# Patient Record
Sex: MALE | Race: WHITE | NOT HISPANIC OR LATINO | Employment: FULL TIME | URBAN - METROPOLITAN AREA
[De-identification: names, ages, dates, MRNs, and addresses within clinical notes are randomized per-mention and may not be internally consistent; named-entity substitution may affect disease eponyms.]

---

## 2020-01-09 ENCOUNTER — OFFICE VISIT (OUTPATIENT)
Dept: FAMILY MEDICINE | Facility: CLINIC | Age: 30
End: 2020-01-09
Payer: COMMERCIAL

## 2020-01-09 VITALS
DIASTOLIC BLOOD PRESSURE: 66 MMHG | SYSTOLIC BLOOD PRESSURE: 114 MMHG | OXYGEN SATURATION: 98 % | BODY MASS INDEX: 33.46 KG/M2 | HEART RATE: 65 BPM | HEIGHT: 72 IN | WEIGHT: 247 LBS | TEMPERATURE: 98 F

## 2020-01-09 DIAGNOSIS — Z00.01 ENCOUNTER FOR ROUTINE ADULT HEALTH EXAMINATION WITH ABNORMAL FINDINGS: Primary | ICD-10-CM

## 2020-01-09 DIAGNOSIS — F98.8 ATTENTION DEFICIT DISORDER (ADD) IN ADULT: ICD-10-CM

## 2020-01-09 DIAGNOSIS — F41.8 MIXED ANXIETY AND DEPRESSIVE DISORDER: ICD-10-CM

## 2020-01-09 DIAGNOSIS — E66.09 CLASS 1 OBESITY DUE TO EXCESS CALORIES WITH BODY MASS INDEX (BMI) OF 33.0 TO 33.9 IN ADULT, UNSPECIFIED WHETHER SERIOUS COMORBIDITY PRESENT: ICD-10-CM

## 2020-01-09 PROCEDURE — 99385 PREV VISIT NEW AGE 18-39: CPT | Mod: S$GLB,,, | Performed by: NURSE PRACTITIONER

## 2020-01-09 PROCEDURE — 99385 PR PREVENTIVE VISIT,NEW,18-39: ICD-10-PCS | Mod: S$GLB,,, | Performed by: NURSE PRACTITIONER

## 2020-01-09 RX ORDER — SERTRALINE HYDROCHLORIDE 50 MG/1
TABLET, FILM COATED ORAL
Qty: 30 TABLET | Refills: 2 | Status: SHIPPED | OUTPATIENT
Start: 2020-01-09 | End: 2020-02-10

## 2020-01-09 RX ORDER — LISDEXAMFETAMINE DIMESYLATE 70 MG/1
70 CAPSULE ORAL EVERY MORNING
Qty: 30 CAPSULE | Refills: 0 | Status: SHIPPED | OUTPATIENT
Start: 2020-01-09 | End: 2020-02-10 | Stop reason: SDUPTHER

## 2020-01-09 NOTE — PROGRESS NOTES
SUBJECTIVE:      Patient ID: Paco Upton is a 29 y.o. male.    Chief Complaint: Annual Exam (wellness exam)    New patient here for a wellness visit today. He has no major PMH except ADD- he was diagnosed at age 10 and has been taking medications since. He is unable to concentrate and focus on is work without the medication. He does take the medication daily without SE or complaints with drug holidays on weekends and holidays off. He is due for routine labs today. He declines the flu shot today. He is c/o feeling down/mildly depressed and anxious- see below for further.     Anxiety   Presents for initial visit. Onset was 1 to 5 years ago. The problem has been gradually worsening. Symptoms include decreased concentration, depressed mood, nervous/anxious behavior and panic (with social activity/events). Patient reports no chest pain, confusion, dizziness, excessive worry, feeling of choking, impotence, insomnia, irritability, nausea, obsessions, palpitations, shortness of breath or suicidal ideas. Symptoms occur most days. The severity of symptoms is moderate. The symptoms are aggravated by social activities and work stress. The quality of sleep is good. Nighttime awakenings: none.     There are no known risk factors. His past medical history is significant for anxiety/panic attacks. There is no history of suicide attempts. Past treatments include lifestyle changes. The treatment provided no relief.       History reviewed. No pertinent family history.   Social History     Socioeconomic History    Marital status: Single     Spouse name: Not on file    Number of children: Not on file    Years of education: Not on file    Highest education level: Not on file   Occupational History    Not on file   Social Needs    Financial resource strain: Not on file    Food insecurity:     Worry: Not on file     Inability: Not on file    Transportation needs:     Medical: Not on file     Non-medical: Not on file   Tobacco  Use    Smoking status: Never Smoker   Substance and Sexual Activity    Alcohol use: Yes    Drug use: No    Sexual activity: Not on file   Lifestyle    Physical activity:     Days per week: Not on file     Minutes per session: Not on file    Stress: Not on file   Relationships    Social connections:     Talks on phone: Not on file     Gets together: Not on file     Attends Hinduism service: Not on file     Active member of club or organization: Not on file     Attends meetings of clubs or organizations: Not on file     Relationship status: Not on file   Other Topics Concern    Not on file   Social History Narrative    Not on file     Current Outpatient Medications   Medication Sig Dispense Refill    lisdexamfetamine (VYVANSE) 70 MG capsule Take 1 capsule (70 mg total) by mouth every morning. 30 capsule 0    sertraline (ZOLOFT) 50 MG tablet Take 1/2 tablet by mouth x 7 days at bedtime, then start 1 tablet nightly 30 tablet 2     No current facility-administered medications for this visit.      Review of patient's allergies indicates:   Allergen Reactions    Sulfa (sulfonamide antibiotics) Anaphylaxis      Past Medical History:   Diagnosis Date    ADD (attention deficit disorder)     Stomach problems     delayed empting     History reviewed. No pertinent surgical history.    Review of Systems   Constitutional: Negative for appetite change, chills, diaphoresis, fatigue, fever, irritability and unexpected weight change.   HENT: Negative for congestion, ear discharge, ear pain, rhinorrhea, sore throat and trouble swallowing.    Eyes: Negative for pain and visual disturbance.        Had recent eye exam    Respiratory: Negative for cough, shortness of breath and wheezing.    Cardiovascular: Negative for chest pain, palpitations and leg swelling.   Gastrointestinal: Negative for abdominal pain, blood in stool, constipation, diarrhea, nausea and vomiting.        No heartburn or GERD    Endocrine: Negative for  cold intolerance, heat intolerance, polydipsia and polyuria.   Genitourinary: Negative for dysuria, frequency, hematuria and impotence.   Musculoskeletal: Negative for arthralgias, back pain, joint swelling, myalgias and neck pain.   Skin: Negative for rash.   Neurological: Negative for dizziness, syncope, weakness and headaches.   Hematological: Negative for adenopathy. Does not bruise/bleed easily.   Psychiatric/Behavioral: Positive for decreased concentration and dysphoric mood. Negative for confusion, sleep disturbance and suicidal ideas. The patient is nervous/anxious. The patient does not have insomnia.       OBJECTIVE:      Vitals:    01/09/20 0759   BP: 114/66   BP Location: Left arm   Patient Position: Sitting   BP Method: X-Large (Manual)   Pulse: 65   Temp: 98.2 °F (36.8 °C)   TempSrc: Oral   SpO2: 98%   Weight: 112 kg (247 lb)   Height: 6' (1.829 m)     Physical Exam   Constitutional: He is oriented to person, place, and time. He appears well-developed and well-nourished. No distress.   HENT:   Head: Normocephalic and atraumatic.   Right Ear: Tympanic membrane, external ear and ear canal normal.   Left Ear: Tympanic membrane, external ear and ear canal normal.   Nose: Nose normal.   Mouth/Throat: Uvula is midline, oropharynx is clear and moist and mucous membranes are normal. No oropharyngeal exudate or posterior oropharyngeal erythema. Tonsils are 1+ on the right. Tonsils are 1+ on the left. No tonsillar exudate.   Eyes: Pupils are equal, round, and reactive to light. Conjunctivae are normal. No scleral icterus.   Neck: Normal range of motion. Neck supple. No thyromegaly present.   Cardiovascular: Normal rate, regular rhythm and normal heart sounds. Exam reveals no gallop and no friction rub.   No murmur heard.  Pulmonary/Chest: Effort normal and breath sounds normal. No respiratory distress. He has no wheezes. He has no rales.   Abdominal: Soft. Bowel sounds are normal. He exhibits no distension and no  mass. There is no tenderness.   Musculoskeletal: Normal range of motion. He exhibits no edema.   Lymphadenopathy:     He has no cervical adenopathy.   Neurological: He is alert and oriented to person, place, and time.   Skin: Skin is warm and dry. No rash noted. He is not diaphoretic.   Psychiatric: His speech is normal and behavior is normal. Judgment and thought content normal. Cognition and memory are normal. He exhibits a depressed mood (mildly ).   Nursing note and vitals reviewed.     Assessment:       1. Encounter for routine adult health examination with abnormal findings    2. Mixed anxiety and depressive disorder    3. Attention deficit disorder (ADD) in adult    4. Class 1 obesity due to excess calories with body mass index (BMI) of 33.0 to 33.9 in adult, unspecified whether serious comorbidity present        Plan:       Encounter for routine adult health examination with abnormal findings  -     CBC auto differential; Future; Expected date: 01/09/2020  -     Comprehensive metabolic panel; Future; Expected date: 01/09/2020  -     Lipid panel; Future; Expected date: 01/09/2020    Mixed anxiety and depressive disorder  -     CBC auto differential; Future; Expected date: 01/09/2020  -     Comprehensive metabolic panel; Future; Expected date: 01/09/2020  -     TSH; Future; Expected date: 01/09/2020  -     Vitamin D; Future; Expected date: 01/09/2020  -     sertraline (ZOLOFT) 50 MG tablet; Take 1/2 tablet by mouth x 7 days at bedtime, then start 1 tablet nightly  Dispense: 30 tablet; Refill: 2    *We had a long discussion about stimulant for ADD may be increasing his anxiety and I would recommend decrease dose- he refused and will take drug holidays; Patient will be started on daily antidepressant and was informed that symptom relief may take 2-4 weeks. Patient was encouraged to avoid abrupt cessation of medication and was educated on the potential medication side effects. Exercise was encouraged as an  adjunctive treatment. Will follow-up 4 weeks.  Advice pt to go to ER if having SI or HI.     Attention deficit disorder (ADD) in adult  -     lisdexamfetamine (VYVANSE) 70 MG capsule; Take 1 capsule (70 mg total) by mouth every morning.  Dispense: 30 capsule; Refill: 0    Class 1 obesity due to excess calories with body mass index (BMI) of 33.0 to 33.9 in adult, unspecified whether serious comorbidity present        Follow up in about 1 month (around 2/9/2020).      1/9/2020 JESSICA Parr, FNP

## 2020-01-24 ENCOUNTER — TELEPHONE (OUTPATIENT)
Dept: FAMILY MEDICINE | Facility: CLINIC | Age: 30
End: 2020-01-24

## 2020-01-24 NOTE — TELEPHONE ENCOUNTER
Pt was seen on 1/9/20 and was prescribed Zoloft. Pt has called and stated that this medication has caused other issues for him, such as ED. Pt did not elaborate on other issues at this time. Pt has asked if a different medication can be ordered and if he will need to be weaned off of the Zoloft as well.

## 2020-02-10 ENCOUNTER — OFFICE VISIT (OUTPATIENT)
Dept: FAMILY MEDICINE | Facility: CLINIC | Age: 30
End: 2020-02-10
Payer: COMMERCIAL

## 2020-02-10 VITALS
WEIGHT: 236.31 LBS | HEIGHT: 72 IN | TEMPERATURE: 98 F | BODY MASS INDEX: 32.01 KG/M2 | SYSTOLIC BLOOD PRESSURE: 138 MMHG | OXYGEN SATURATION: 99 % | DIASTOLIC BLOOD PRESSURE: 78 MMHG | HEART RATE: 82 BPM

## 2020-02-10 DIAGNOSIS — F41.8 MIXED ANXIETY AND DEPRESSIVE DISORDER: Primary | ICD-10-CM

## 2020-02-10 DIAGNOSIS — F98.8 ATTENTION DEFICIT DISORDER (ADD) IN ADULT: ICD-10-CM

## 2020-02-10 PROCEDURE — 3008F PR BODY MASS INDEX (BMI) DOCUMENTED: ICD-10-PCS | Mod: S$GLB,,, | Performed by: NURSE PRACTITIONER

## 2020-02-10 PROCEDURE — 99213 PR OFFICE/OUTPT VISIT, EST, LEVL III, 20-29 MIN: ICD-10-PCS | Mod: S$GLB,,, | Performed by: NURSE PRACTITIONER

## 2020-02-10 PROCEDURE — 99213 OFFICE O/P EST LOW 20 MIN: CPT | Mod: S$GLB,,, | Performed by: NURSE PRACTITIONER

## 2020-02-10 PROCEDURE — 3008F BODY MASS INDEX DOCD: CPT | Mod: S$GLB,,, | Performed by: NURSE PRACTITIONER

## 2020-02-10 RX ORDER — BUSPIRONE HYDROCHLORIDE 7.5 MG/1
7.5 TABLET ORAL 2 TIMES DAILY
Qty: 60 TABLET | Refills: 3 | Status: SHIPPED | OUTPATIENT
Start: 2020-02-10 | End: 2020-03-11 | Stop reason: SDUPTHER

## 2020-02-10 RX ORDER — LISDEXAMFETAMINE DIMESYLATE 70 MG/1
70 CAPSULE ORAL EVERY MORNING
Qty: 30 CAPSULE | Refills: 0 | Status: SHIPPED | OUTPATIENT
Start: 2020-02-10 | End: 2020-03-11 | Stop reason: SDUPTHER

## 2020-02-10 NOTE — PROGRESS NOTES
SUBJECTIVE:      Patient ID: Paco Upton is a 29 y.o. male.    Chief Complaint: Follow-up (1 month follow up, anxiety & depression)    Established patient here for follow up on anxiety and depression. He tried the Zoloft for a few weeks but states it caused him to have sexual dysfunction problems- could get an erection but not maintain it. He has never had these issues since or previous to the medication. He is taking his Adderall during the week and taking breaks on the weekend/holidays.     Anxiety   Presents for follow-up visit. Onset was 1 to 5 years ago. The problem has been gradually worsening. Symptoms include decreased concentration, depressed mood, nervous/anxious behavior and panic (with social activity/events). Patient reports no chest pain, confusion, dizziness, excessive worry, feeling of choking, impotence, insomnia, irritability, nausea, obsessions, palpitations, shortness of breath or suicidal ideas. Symptoms occur most days. The severity of symptoms is moderate. The symptoms are aggravated by social activities and work stress. The quality of sleep is good. Nighttime awakenings: none.     There are no known risk factors. His past medical history is significant for anxiety/panic attacks. There is no history of suicide attempts. Past treatments include lifestyle changes. The treatment provided no relief.       History reviewed. No pertinent family history.   Social History     Socioeconomic History    Marital status: Single     Spouse name: Not on file    Number of children: Not on file    Years of education: Not on file    Highest education level: Not on file   Occupational History    Not on file   Social Needs    Financial resource strain: Not on file    Food insecurity:     Worry: Not on file     Inability: Not on file    Transportation needs:     Medical: Not on file     Non-medical: Not on file   Tobacco Use    Smoking status: Never Smoker   Substance and Sexual Activity    Alcohol  use: Yes    Drug use: No    Sexual activity: Not on file   Lifestyle    Physical activity:     Days per week: Not on file     Minutes per session: Not on file    Stress: Not on file   Relationships    Social connections:     Talks on phone: Not on file     Gets together: Not on file     Attends Hindu service: Not on file     Active member of club or organization: Not on file     Attends meetings of clubs or organizations: Not on file     Relationship status: Not on file   Other Topics Concern    Not on file   Social History Narrative    Not on file     Current Outpatient Medications   Medication Sig Dispense Refill    lisdexamfetamine (VYVANSE) 70 MG capsule Take 1 capsule (70 mg total) by mouth every morning. 30 capsule 0    busPIRone (BUSPAR) 7.5 MG tablet Take 1 tablet (7.5 mg total) by mouth 2 (two) times daily. 60 tablet 3     No current facility-administered medications for this visit.      Review of patient's allergies indicates:   Allergen Reactions    Sulfa (sulfonamide antibiotics) Anaphylaxis      Past Medical History:   Diagnosis Date    ADD (attention deficit disorder)     Stomach problems     delayed empting     History reviewed. No pertinent surgical history.    Review of Systems   Constitutional: Negative for chills, fatigue, fever, irritability and unexpected weight change.   HENT: Negative for congestion, rhinorrhea, sore throat and trouble swallowing.    Eyes: Negative for pain and visual disturbance.        Had recent eye exam    Respiratory: Negative for cough and shortness of breath.    Cardiovascular: Negative for chest pain, palpitations and leg swelling.   Gastrointestinal: Negative for abdominal pain, diarrhea, nausea and vomiting.   Endocrine: Negative for cold intolerance, heat intolerance, polydipsia and polyuria.   Genitourinary: Negative for dysuria, frequency and impotence.   Neurological: Negative for dizziness, syncope, weakness and headaches.   Hematological:  Negative for adenopathy.   Psychiatric/Behavioral: Positive for decreased concentration and dysphoric mood. Negative for confusion, sleep disturbance and suicidal ideas. The patient is nervous/anxious. The patient does not have insomnia.       OBJECTIVE:      Vitals:    02/10/20 0837   BP: 138/78   BP Location: Left arm   Patient Position: Sitting   BP Method: X-Large (Manual)   Pulse: 82   Temp: 98.4 °F (36.9 °C)   TempSrc: Oral   SpO2: 99%   Weight: 107.2 kg (236 lb 4.8 oz)   Height: 6' (1.829 m)     Physical Exam   Constitutional: He is oriented to person, place, and time. He appears well-developed and well-nourished. No distress.   HENT:   Head: Normocephalic and atraumatic.   Right Ear: Tympanic membrane and ear canal normal.   Left Ear: Tympanic membrane and ear canal normal.   Mouth/Throat: Uvula is midline, oropharynx is clear and moist and mucous membranes are normal. No oropharyngeal exudate or posterior oropharyngeal erythema.   Eyes: Pupils are equal, round, and reactive to light.   Neck: Normal range of motion. Neck supple. No thyromegaly present.   Cardiovascular: Normal rate, regular rhythm and normal heart sounds.   No murmur heard.  Pulmonary/Chest: Effort normal and breath sounds normal. He has no wheezes. He has no rales.   Lymphadenopathy:     He has no cervical adenopathy.   Neurological: He is alert and oriented to person, place, and time.   Skin: Skin is warm and dry. He is not diaphoretic.   Psychiatric: His speech is normal and behavior is normal. Judgment and thought content normal. Cognition and memory are normal. He exhibits a depressed mood (mildly ).   Nursing note and vitals reviewed.     Assessment:       1. Mixed anxiety and depressive disorder    2. Attention deficit disorder (ADD) in adult        Plan:       Mixed anxiety and depressive disorder  -     busPIRone (BUSPAR) 7.5 MG tablet; Take 1 tablet (7.5 mg total) by mouth 2 (two) times daily.  Dispense: 60 tablet; Refill:  3    *will try Buspar d/t SE with SSRI; discussed SE of new medication; follow up in 1 mo; reminded pt he needs to get labs     Attention deficit disorder (ADD) in adult  -     lisdexamfetamine (VYVANSE) 70 MG capsule; Take 1 capsule (70 mg total) by mouth every morning.  Dispense: 30 capsule; Refill: 0        Follow up in about 1 month (around 3/10/2020) for f/u anxiety .      2/10/2020 JESSICA Parr, FARZANAP

## 2020-02-10 NOTE — PATIENT INSTRUCTIONS
Anxiety Reaction  Anxiety is the feeling we all get when we think something bad might happen. It is a normal response to stress and usually causes only a mild reaction. When anxiety becomes more severe, it can interfere with daily life. In some cases, you may not even be aware of what it is youre anxious about. There may also be a genetic link or it may be a learned behavior in the home.  Both psychological and physical triggers cause stress reaction. It's often a response to fear or emotional stress, real or imagined. This stress may come from home, family, work, or social relationships.  During an anxiety reaction, you may feel:  · Helpless  · Nervous  · Depressed  · Irritable  Your body may show signs of anxiety in many ways. You may experience:  · Dry mouth  · Shakiness  · Dizziness  · Weakness  · Trouble breathing  · Breathing fast (hyperventilating)  · Chest pressure  · Sweating  · Headache  · Nausea  · Diarrhea  · Tiredness  · Inability to sleep  · Sexual problems  Home care  · Try to locate the sources of stress in your life. They may not be obvious. These may include:  ¨ Daily hassles of life (traffic jams, missed appointments, car troubles, etc.)  ¨ Major life changes, both good (new baby, job promotion) and bad (loss of job, loss of loved one)  ¨ Overload: feeling that you have too many responsibilities and can't take care of all of them at once  ¨ Feeling helpless, feeling that your problems are beyond what youre able to solve  · Notice how your body reacts to stress. Learn to listen to your body signals. This will help you take action before the stress becomes severe.  · When you can, do something about the source of your stress. (Avoid hassles, limit the amount of change that happens in your life at one time and take a break when you feel overloaded).  · Unfortunately, many stressful situations can't be avoided. It is necessary to learn how to better manage stress. There are many proven methods  that will reduce your anxiety. These include simple things like exercise, good nutrition and adequate rest. Also, there are certain techniques that are helpful:  ¨ Relaxation  ¨ Breathing exercises  ¨ Visualization  ¨ Biofeedback  ¨ Meditation  For more information about this, consult your doctor or go to a local bookstore and review the many books and tapes available on this subject.  Follow-up care  If you feel that your anxiety is not responding to self-help measures, contact your doctor or make an appointment with a counselor. You may need short-term psychological counseling and temporary medicine to help you manage stress.  Call 911  Call your healthcare provider right away if any of these occur:  · Trouble breathing  · Confusion  · Drowsiness or trouble wakening  · Fainting or loss of consciousness  · Rapid heart rate  · Seizure  · New chest pain that becomes more severe, lasts longer, or spreads into your shoulder, arm, neck, jaw, or back  When to seek medical advice  Call your healthcare provider right away if any of these occur:  · Your symptoms get worse  · Severe headache not relieved by rest and mild pain reliever  Date Last Reviewed: 9/29/2015  © 0729-4134 [employer]. [address] [place] [id number]. All rights reserved. This information is not intended as a substitute for professional medical care. Always follow your healthcare professional's instructions.

## 2020-02-24 ENCOUNTER — TELEPHONE (OUTPATIENT)
Dept: FAMILY MEDICINE | Facility: CLINIC | Age: 30
End: 2020-02-24

## 2020-02-24 NOTE — TELEPHONE ENCOUNTER
Pt's wife called and states that Buspar is not working for this pt. Pt wants to know if he can try Wellbutrin instead.

## 2020-02-28 ENCOUNTER — TELEPHONE (OUTPATIENT)
Dept: FAMILY MEDICINE | Facility: CLINIC | Age: 30
End: 2020-02-28

## 2020-02-28 NOTE — TELEPHONE ENCOUNTER
Spoke with ayaan regarding pt's Buspar Rx. Pt informed that Buspar takes approx 4 weeks to work per Nadeen Degroot. Verbalized understanding.

## 2020-03-11 ENCOUNTER — OFFICE VISIT (OUTPATIENT)
Dept: FAMILY MEDICINE | Facility: CLINIC | Age: 30
End: 2020-03-11
Payer: COMMERCIAL

## 2020-03-11 ENCOUNTER — LAB VISIT (OUTPATIENT)
Dept: LAB | Facility: HOSPITAL | Age: 30
End: 2020-03-11
Attending: NURSE PRACTITIONER
Payer: COMMERCIAL

## 2020-03-11 ENCOUNTER — TELEPHONE (OUTPATIENT)
Dept: FAMILY MEDICINE | Facility: CLINIC | Age: 30
End: 2020-03-11

## 2020-03-11 VITALS
HEART RATE: 67 BPM | HEIGHT: 72 IN | BODY MASS INDEX: 31.9 KG/M2 | SYSTOLIC BLOOD PRESSURE: 132 MMHG | WEIGHT: 235.5 LBS | DIASTOLIC BLOOD PRESSURE: 80 MMHG | TEMPERATURE: 98 F | OXYGEN SATURATION: 97 %

## 2020-03-11 DIAGNOSIS — F98.8 ATTENTION DEFICIT DISORDER (ADD) IN ADULT: ICD-10-CM

## 2020-03-11 DIAGNOSIS — F41.8 MIXED ANXIETY AND DEPRESSIVE DISORDER: ICD-10-CM

## 2020-03-11 DIAGNOSIS — Z00.01 ENCOUNTER FOR ROUTINE ADULT HEALTH EXAMINATION WITH ABNORMAL FINDINGS: ICD-10-CM

## 2020-03-11 DIAGNOSIS — F41.8 MIXED ANXIETY AND DEPRESSIVE DISORDER: Primary | ICD-10-CM

## 2020-03-11 LAB
25(OH)D3+25(OH)D2 SERPL-MCNC: 29 NG/ML (ref 30–96)
ALBUMIN SERPL BCP-MCNC: 4.6 G/DL (ref 3.5–5.2)
ALP SERPL-CCNC: 71 U/L (ref 55–135)
ALT SERPL W/O P-5'-P-CCNC: 57 U/L (ref 10–44)
ANION GAP SERPL CALC-SCNC: 11 MMOL/L (ref 8–16)
AST SERPL-CCNC: 37 U/L (ref 10–40)
BASOPHILS # BLD AUTO: 0.04 K/UL (ref 0–0.2)
BASOPHILS NFR BLD: 0.6 % (ref 0–1.9)
BILIRUB SERPL-MCNC: 0.7 MG/DL (ref 0.1–1)
BUN SERPL-MCNC: 28 MG/DL (ref 6–20)
CALCIUM SERPL-MCNC: 9.4 MG/DL (ref 8.7–10.5)
CHLORIDE SERPL-SCNC: 102 MMOL/L (ref 95–110)
CHOLEST SERPL-MCNC: 158 MG/DL (ref 120–199)
CHOLEST/HDLC SERPL: 4.3 {RATIO} (ref 2–5)
CO2 SERPL-SCNC: 27 MMOL/L (ref 23–29)
CREAT SERPL-MCNC: 1 MG/DL (ref 0.5–1.4)
DIFFERENTIAL METHOD: NORMAL
EOSINOPHIL # BLD AUTO: 0.1 K/UL (ref 0–0.5)
EOSINOPHIL NFR BLD: 1.4 % (ref 0–8)
ERYTHROCYTE [DISTWIDTH] IN BLOOD BY AUTOMATED COUNT: 12.5 % (ref 11.5–14.5)
EST. GFR  (AFRICAN AMERICAN): >60 ML/MIN/1.73 M^2
EST. GFR  (NON AFRICAN AMERICAN): >60 ML/MIN/1.73 M^2
GLUCOSE SERPL-MCNC: 103 MG/DL (ref 70–110)
HCT VFR BLD AUTO: 44.3 % (ref 40–54)
HDLC SERPL-MCNC: 37 MG/DL (ref 40–75)
HDLC SERPL: 23.4 % (ref 20–50)
HGB BLD-MCNC: 14.7 G/DL (ref 14–18)
IMM GRANULOCYTES # BLD AUTO: 0.02 K/UL (ref 0–0.04)
IMM GRANULOCYTES NFR BLD AUTO: 0.3 % (ref 0–0.5)
LDLC SERPL CALC-MCNC: 100.6 MG/DL (ref 63–159)
LYMPHOCYTES # BLD AUTO: 2.5 K/UL (ref 1–4.8)
LYMPHOCYTES NFR BLD: 33.8 % (ref 18–48)
MCH RBC QN AUTO: 28.7 PG (ref 27–31)
MCHC RBC AUTO-ENTMCNC: 33.2 G/DL (ref 32–36)
MCV RBC AUTO: 87 FL (ref 82–98)
MONOCYTES # BLD AUTO: 0.5 K/UL (ref 0.3–1)
MONOCYTES NFR BLD: 6.8 % (ref 4–15)
NEUTROPHILS # BLD AUTO: 4.1 K/UL (ref 1.8–7.7)
NEUTROPHILS NFR BLD: 57.1 % (ref 38–73)
NONHDLC SERPL-MCNC: 121 MG/DL
NRBC BLD-RTO: 0 /100 WBC
PLATELET # BLD AUTO: 222 K/UL (ref 150–350)
PMV BLD AUTO: 10.1 FL (ref 9.2–12.9)
POTASSIUM SERPL-SCNC: 4 MMOL/L (ref 3.5–5.1)
PROT SERPL-MCNC: 7.5 G/DL (ref 6–8.4)
RBC # BLD AUTO: 5.12 M/UL (ref 4.6–6.2)
SODIUM SERPL-SCNC: 140 MMOL/L (ref 136–145)
TRIGL SERPL-MCNC: 102 MG/DL (ref 30–150)
TSH SERPL DL<=0.005 MIU/L-ACNC: 2.15 UIU/ML (ref 0.34–5.6)
WBC # BLD AUTO: 7.24 K/UL (ref 3.9–12.7)

## 2020-03-11 PROCEDURE — 99213 PR OFFICE/OUTPT VISIT, EST, LEVL III, 20-29 MIN: ICD-10-PCS | Mod: S$GLB,,, | Performed by: NURSE PRACTITIONER

## 2020-03-11 PROCEDURE — 3008F BODY MASS INDEX DOCD: CPT | Mod: S$GLB,,, | Performed by: NURSE PRACTITIONER

## 2020-03-11 PROCEDURE — 80061 LIPID PANEL: CPT

## 2020-03-11 PROCEDURE — 85025 COMPLETE CBC W/AUTO DIFF WBC: CPT

## 2020-03-11 PROCEDURE — 36415 COLL VENOUS BLD VENIPUNCTURE: CPT

## 2020-03-11 PROCEDURE — 82306 VITAMIN D 25 HYDROXY: CPT

## 2020-03-11 PROCEDURE — 80053 COMPREHEN METABOLIC PANEL: CPT

## 2020-03-11 PROCEDURE — 84443 ASSAY THYROID STIM HORMONE: CPT

## 2020-03-11 PROCEDURE — 3008F PR BODY MASS INDEX (BMI) DOCUMENTED: ICD-10-PCS | Mod: S$GLB,,, | Performed by: NURSE PRACTITIONER

## 2020-03-11 PROCEDURE — 99213 OFFICE O/P EST LOW 20 MIN: CPT | Mod: S$GLB,,, | Performed by: NURSE PRACTITIONER

## 2020-03-11 RX ORDER — LISDEXAMFETAMINE DIMESYLATE 70 MG/1
70 CAPSULE ORAL EVERY MORNING
Qty: 30 CAPSULE | Refills: 0 | Status: SHIPPED | OUTPATIENT
Start: 2020-03-11 | End: 2020-04-24 | Stop reason: SDUPTHER

## 2020-03-11 RX ORDER — BUSPIRONE HYDROCHLORIDE 10 MG/1
10 TABLET ORAL 2 TIMES DAILY
Qty: 60 TABLET | Refills: 3 | Status: SHIPPED | OUTPATIENT
Start: 2020-03-11 | End: 2020-07-01

## 2020-03-11 RX ORDER — BUPROPION HYDROCHLORIDE 150 MG/1
150 TABLET ORAL DAILY
Qty: 30 TABLET | Refills: 3 | Status: SHIPPED | OUTPATIENT
Start: 2020-03-11 | End: 2020-07-06

## 2020-03-11 NOTE — TELEPHONE ENCOUNTER
----- Message from MARK Albert sent at 3/11/2020 10:00 AM CDT -----  Please notify patient that results are normal- vitamin D a little low; recommend starting a daily supplement 800 IU to keep levels up

## 2020-03-11 NOTE — PROGRESS NOTES
"    SUBJECTIVE:      Patient ID: Paco Upton is a 29 y.o. male.    Chief Complaint: Anxiety (1 month f/u)    Established patient here for follow up on anxiety and depression. He has been taking the Buspar daily but is not sure it is working well. He feels frustrated and more anxious around lunch. He is a admitted "control freak" and sets high expectations for himself. He deneis any SI/HI or plans.   Anxiety   Presents for follow-up visit. Onset was 1 to 5 years ago. The problem has been gradually worsening. Symptoms include decreased concentration, depressed mood, nervous/anxious behavior and panic (with social activity/events). Patient reports no chest pain, confusion, dizziness, excessive worry, feeling of choking, impotence, insomnia, irritability, nausea, obsessions, palpitations, shortness of breath or suicidal ideas. Symptoms occur most days. The severity of symptoms is moderate. The symptoms are aggravated by social activities and work stress. The quality of sleep is good. Nighttime awakenings: none.     There are no known risk factors. His past medical history is significant for anxiety/panic attacks. There is no history of suicide attempts. Past treatments include lifestyle changes. The treatment provided no relief. Compliance with medications is %.       History reviewed. No pertinent family history.   Social History     Socioeconomic History    Marital status: Single     Spouse name: Not on file    Number of children: Not on file    Years of education: Not on file    Highest education level: Not on file   Occupational History    Not on file   Social Needs    Financial resource strain: Not on file    Food insecurity:     Worry: Not on file     Inability: Not on file    Transportation needs:     Medical: Not on file     Non-medical: Not on file   Tobacco Use    Smoking status: Never Smoker   Substance and Sexual Activity    Alcohol use: Yes    Drug use: No    Sexual activity: Not on file "   Lifestyle    Physical activity:     Days per week: Not on file     Minutes per session: Not on file    Stress: Not on file   Relationships    Social connections:     Talks on phone: Not on file     Gets together: Not on file     Attends [employer] service: Not on file     Active member of club or organization: Not on file     Attends meetings of clubs or organizations: Not on file     Relationship status: Not on file   Other Topics Concern    Not on file   Social History Narrative    Not on file     Current Outpatient Medications   Medication Sig Dispense Refill    busPIRone (BUSPAR) 10 MG tablet Take 1 tablet (10 mg total) by mouth 2 (two) times daily. 60 tablet 3    lisdexamfetamine (VYVANSE) 70 MG capsule Take 1 capsule (70 mg total) by mouth every morning. 30 capsule 0    buPROPion (WELLBUTRIN XL) 150 MG TB24 tablet Take 1 tablet (150 mg total) by mouth once daily. 30 tablet 3     No current facility-administered medications for this visit.      Review of patient's allergies indicates:   Allergen Reactions    Sulfa (sulfonamide antibiotics) Anaphylaxis      Past Medical History:   Diagnosis Date    ADD (attention deficit disorder)     Stomach problems     delayed empting     History reviewed. No pertinent surgical history.    Review of Systems   Constitutional: Negative for chills, fatigue, fever, irritability and unexpected weight change.   HENT: Negative for congestion, rhinorrhea, sore throat and trouble swallowing.    Respiratory: Negative for cough and shortness of breath.    Cardiovascular: Negative for chest pain, palpitations and leg swelling.   Gastrointestinal: Negative for abdominal pain, diarrhea, nausea and vomiting.   Endocrine: Negative for polydipsia and polyuria.   Genitourinary: Negative for dysuria, frequency and impotence.   Neurological: Negative for dizziness, syncope, weakness and headaches.   Hematological: Negative for adenopathy.   Psychiatric/Behavioral: Positive for  decreased concentration and dysphoric mood. Negative for confusion, sleep disturbance and suicidal ideas. The patient is nervous/anxious. The patient does not have insomnia.       OBJECTIVE:      Vitals:    03/11/20 0819   BP: 132/80   BP Location: Left arm   Patient Position: Sitting   BP Method: Large (Manual)   Pulse: 67   Temp: 97.7 °F (36.5 °C)   TempSrc: Oral   SpO2: 97%   Weight: 106.8 kg (235 lb 8 oz)   Height: 6' (1.829 m)     Physical Exam   Constitutional: He is oriented to person, place, and time. He appears well-developed and well-nourished. No distress.   HENT:   Head: Normocephalic and atraumatic.   Right Ear: Tympanic membrane and ear canal normal.   Left Ear: Tympanic membrane and ear canal normal.   Mouth/Throat: Uvula is midline, oropharynx is clear and moist and mucous membranes are normal. No oropharyngeal exudate or posterior oropharyngeal erythema.   Eyes: Pupils are equal, round, and reactive to light.   Cardiovascular: Normal rate, regular rhythm and normal heart sounds.   No murmur heard.  Pulmonary/Chest: Effort normal and breath sounds normal. He has no wheezes. He has no rales.   Neurological: He is alert and oriented to person, place, and time.   Skin: Skin is warm and dry. He is not diaphoretic.   Psychiatric: His speech is normal and behavior is normal. Judgment and thought content normal. Cognition and memory are normal. He exhibits a depressed mood (mildly ).   Nursing note and vitals reviewed.     Assessment:       1. Mixed anxiety and depressive disorder    2. Attention deficit disorder (ADD) in adult        Plan:       Mixed anxiety and depressive disorder  -     busPIRone (BUSPAR) 10 MG tablet; Take 1 tablet (10 mg total) by mouth 2 (two) times daily.  Dispense: 60 tablet; Refill: 3  -     buPROPion (WELLBUTRIN XL) 150 MG TB24 tablet; Take 1 tablet (150 mg total) by mouth once daily.  Dispense: 30 tablet; Refill: 3     *increase Buspar to 10 mg daily, add in Wellbutrin for  depression; discussed SE of new med and verbalized understanding     Attention deficit disorder (ADD) in adult  -     lisdexamfetamine (VYVANSE) 70 MG capsule; Take 1 capsule (70 mg total) by mouth every morning.  Dispense: 30 capsule; Refill: 0        Follow up in about 1 month (around 4/11/2020) for f/u depression, anxiety .      3/11/2020 JESSICA Parr, FNP

## 2020-03-11 NOTE — PROGRESS NOTES
Please notify patient that results are normal- vitamin D a little low; recommend starting a daily supplement 800 IU to keep levels up

## 2020-04-14 ENCOUNTER — PATIENT MESSAGE (OUTPATIENT)
Dept: FAMILY MEDICINE | Facility: CLINIC | Age: 30
End: 2020-04-14

## 2020-04-16 ENCOUNTER — TELEPHONE (OUTPATIENT)
Dept: FAMILY MEDICINE | Facility: CLINIC | Age: 30
End: 2020-04-16

## 2020-04-24 ENCOUNTER — OFFICE VISIT (OUTPATIENT)
Dept: FAMILY MEDICINE | Facility: CLINIC | Age: 30
End: 2020-04-24
Payer: COMMERCIAL

## 2020-04-24 DIAGNOSIS — F98.8 ATTENTION DEFICIT DISORDER (ADD) IN ADULT: ICD-10-CM

## 2020-04-24 DIAGNOSIS — F41.8 MIXED ANXIETY AND DEPRESSIVE DISORDER: Primary | ICD-10-CM

## 2020-04-24 PROCEDURE — 99212 OFFICE O/P EST SF 10 MIN: CPT | Mod: 95,,, | Performed by: NURSE PRACTITIONER

## 2020-04-24 PROCEDURE — 99212 PR OFFICE/OUTPT VISIT, EST, LEVL II, 10-19 MIN: ICD-10-PCS | Mod: 95,,, | Performed by: NURSE PRACTITIONER

## 2020-04-24 RX ORDER — LISDEXAMFETAMINE DIMESYLATE 70 MG/1
70 CAPSULE ORAL EVERY MORNING
Qty: 30 CAPSULE | Refills: 0 | Status: SHIPPED | OUTPATIENT
Start: 2020-04-24 | End: 2020-05-26 | Stop reason: SDUPTHER

## 2020-04-24 NOTE — PROGRESS NOTES
Subjective:        The chief complaint leading to consultation is: f/u anxiety   The patient location is:  Home  Visit type: Virtual visit with synchronous audio/video or audio only  This was a video visit in lieu of in-person visit due to the coronavirus emergency. Patient acknowledged and consented to the video visit encounter.     Established patient here for follow up on anxiety and depression. He has been taking the Buspar daily at the increased dose and is doing well. He is feeling more relaxed and happier since the dose increase. His wife has commented she feels he is doing better and noticed he is happier. He is taking the Vyvanse max of 3 times weekly right now as he has been off work. He denies any new complaints today.     Anxiety   Presents for follow-up visit. Onset was 1 to 5 years ago. The problem has been gradually worsening. Symptoms include decreased concentration, depressed mood (better) and nervous/anxious behavior. Patient reports no chest pain, confusion, dizziness, excessive worry, feeling of choking, impotence, insomnia, irritability, nausea, obsessions, palpitations, panic (with social activity/events), shortness of breath or suicidal ideas. Symptoms occur most days. The severity of symptoms is mild. The symptoms are aggravated by social activities and work stress. The quality of sleep is good. Nighttime awakenings: none.     There are no known risk factors. His past medical history is significant for anxiety/panic attacks. There is no history of suicide attempts. Past treatments include lifestyle changes. The treatment provided no relief. Compliance with medications is %.       History reviewed. No pertinent surgical history.  Past Medical History:   Diagnosis Date    ADD (attention deficit disorder)     Stomach problems     delayed empting     History reviewed. No pertinent family history.     Social History:   Marital Status:   Alcohol History:  reports that he drinks  alcohol.  Tobacco History:  reports that he has never smoked. He does not have any smokeless tobacco history on file.  Drug History:  reports that he does not use drugs.    Review of patient's allergies indicates:   Allergen Reactions    Sulfa (sulfonamide antibiotics) Anaphylaxis       Current Outpatient Medications   Medication Sig Dispense Refill    buPROPion (WELLBUTRIN XL) 150 MG TB24 tablet Take 1 tablet (150 mg total) by mouth once daily. 30 tablet 3    busPIRone (BUSPAR) 10 MG tablet Take 1 tablet (10 mg total) by mouth 2 (two) times daily. 60 tablet 3    lisdexamfetamine (VYVANSE) 70 MG capsule Take 1 capsule (70 mg total) by mouth every morning. 30 capsule 0     No current facility-administered medications for this visit.        Review of Systems   Constitutional: Negative for chills, fatigue, fever, irritability and unexpected weight change.   HENT: Negative for congestion and sore throat.    Respiratory: Negative for cough and shortness of breath.    Cardiovascular: Negative for chest pain, palpitations and leg swelling.   Gastrointestinal: Negative for abdominal pain, diarrhea, nausea and vomiting.   Endocrine: Negative for cold intolerance, polydipsia and polyuria.   Genitourinary: Negative for dysuria, frequency and impotence.   Neurological: Negative for dizziness, syncope, weakness and headaches.   Hematological: Negative for adenopathy.   Psychiatric/Behavioral: Positive for decreased concentration and dysphoric mood. Negative for confusion, sleep disturbance and suicidal ideas. The patient is nervous/anxious. The patient does not have insomnia.          Objective:        Physical Exam:   Physical Exam   Constitutional: He is oriented to person, place, and time. He appears well-developed and well-nourished. No distress.   HENT:   Head: Normocephalic.   Eyes: Pupils are equal, round, and reactive to light.   Neck: Normal range of motion.   Pulmonary/Chest: Effort normal.   Musculoskeletal:  Normal range of motion.   Neurological: He is alert and oriented to person, place, and time.   Skin: He is not diaphoretic.   Psychiatric: He has a normal mood and affect. His behavior is normal. Judgment and thought content normal.            Assessment:       1. Mixed anxiety and depressive disorder    2. Attention deficit disorder (ADD) in adult      Plan:   Mixed anxiety and depressive disorder   *well-controlled on regimen - continue medications     Attention deficit disorder (ADD) in adult   *stable on current therapy- encouraged him to continue taking breaks/holidays when able     Follow up in about 3 months (around 7/24/2020) for f/u anxiety, ADD .    Total time spent with patient: 11 minutes     Each patient to whom he or she provides medical services by telemedicine is:  (1) informed of the relationship between the physician and patient and the respective role of any other health care provider with respect to management of the patient; and (2) notified that he or she may decline to receive medical services by telemedicine and may withdraw from such care at any time.    This note was created using [employer] voice recognition software that occasionally misinterprets phrases or words.

## 2020-05-26 DIAGNOSIS — F98.8 ATTENTION DEFICIT DISORDER (ADD) IN ADULT: ICD-10-CM

## 2020-05-27 RX ORDER — LISDEXAMFETAMINE DIMESYLATE 70 MG/1
70 CAPSULE ORAL EVERY MORNING
Qty: 30 CAPSULE | Refills: 0 | Status: SHIPPED | OUTPATIENT
Start: 2020-05-27 | End: 2020-07-01 | Stop reason: SDUPTHER

## 2020-07-01 DIAGNOSIS — F41.8 MIXED ANXIETY AND DEPRESSIVE DISORDER: ICD-10-CM

## 2020-07-01 DIAGNOSIS — F98.8 ATTENTION DEFICIT DISORDER (ADD) IN ADULT: ICD-10-CM

## 2020-07-01 RX ORDER — BUSPIRONE HYDROCHLORIDE 10 MG/1
TABLET ORAL
Qty: 60 TABLET | Refills: 3 | Status: SHIPPED | OUTPATIENT
Start: 2020-07-01 | End: 2020-09-22

## 2020-07-01 RX ORDER — LISDEXAMFETAMINE DIMESYLATE 70 MG/1
70 CAPSULE ORAL EVERY MORNING
Qty: 30 CAPSULE | Refills: 0 | Status: SHIPPED | OUTPATIENT
Start: 2020-07-01 | End: 2020-08-11 | Stop reason: SDUPTHER

## 2020-07-08 ENCOUNTER — OFFICE VISIT (OUTPATIENT)
Dept: FAMILY MEDICINE | Facility: CLINIC | Age: 30
End: 2020-07-08
Payer: COMMERCIAL

## 2020-07-08 VITALS
SYSTOLIC BLOOD PRESSURE: 132 MMHG | HEART RATE: 89 BPM | BODY MASS INDEX: 32.48 KG/M2 | WEIGHT: 239.81 LBS | TEMPERATURE: 97 F | DIASTOLIC BLOOD PRESSURE: 84 MMHG | HEIGHT: 72 IN | OXYGEN SATURATION: 99 %

## 2020-07-08 DIAGNOSIS — F98.8 ATTENTION DEFICIT DISORDER (ADD) IN ADULT: ICD-10-CM

## 2020-07-08 DIAGNOSIS — F41.8 MIXED ANXIETY AND DEPRESSIVE DISORDER: Primary | ICD-10-CM

## 2020-07-08 PROCEDURE — 3008F PR BODY MASS INDEX (BMI) DOCUMENTED: ICD-10-PCS | Mod: S$GLB,,, | Performed by: NURSE PRACTITIONER

## 2020-07-08 PROCEDURE — 99213 OFFICE O/P EST LOW 20 MIN: CPT | Mod: S$GLB,,, | Performed by: NURSE PRACTITIONER

## 2020-07-08 PROCEDURE — 99213 PR OFFICE/OUTPT VISIT, EST, LEVL III, 20-29 MIN: ICD-10-PCS | Mod: S$GLB,,, | Performed by: NURSE PRACTITIONER

## 2020-07-08 PROCEDURE — 3008F BODY MASS INDEX DOCD: CPT | Mod: S$GLB,,, | Performed by: NURSE PRACTITIONER

## 2020-07-08 NOTE — PROGRESS NOTES
SUBJECTIVE:      Patient ID: Paco Upton is a 29 y.o. male.    Chief Complaint: Anxiety (3 month follow up)    Established patient here for follow up on anxiety and ADD. He has been taking his medications daily as prescribed without SE or complaints.  He feels this current regimen is working well for him and he would like to continue the medications.  He states he does not feel as worked up about little things any more which is to really affect him.  He denies any new complaints or concerns today  Anxiety  Presents for follow-up visit. Onset was 1 to 5 years ago. The problem has been gradually worsening. Symptoms include decreased concentration, depressed mood (better) and nervous/anxious behavior. Patient reports no chest pain, confusion, dizziness, excessive worry, feeling of choking, impotence, insomnia, irritability, nausea, obsessions, palpitations, panic (with social activity/events), shortness of breath or suicidal ideas. Symptoms occur occasionally. The severity of symptoms is mild. The symptoms are aggravated by social activities and work stress. The quality of sleep is good. Nighttime awakenings: none.     There are no known risk factors. His past medical history is significant for anxiety/panic attacks. There is no history of suicide attempts. Past treatments include lifestyle changes. The treatment provided no relief. Compliance with medications is %.       History reviewed. No pertinent family history.   Social History     Socioeconomic History    Marital status:      Spouse name: Not on file    Number of children: Not on file    Years of education: Not on file    Highest education level: Not on file   Occupational History    Not on file   Social Needs    Financial resource strain: Somewhat hard    Food insecurity     Worry: Never true     Inability: Never true    Transportation needs     Medical: No     Non-medical: No   Tobacco Use    Smoking status: Never Smoker   Substance  and Sexual Activity    Alcohol use: Yes     Frequency: Monthly or less     Drinks per session: 1 or 2     Binge frequency: Never    Drug use: No    Sexual activity: Not on file   Lifestyle    Physical activity     Days per week: Not on file     Minutes per session: Not on file    Stress: To some extent   Relationships    Social connections     Talks on phone: Twice a week     Gets together: Never     Attends [employer] service: Not on file     Active member of club or organization: No     Attends meetings of clubs or organizations: Never     Relationship status:    Other Topics Concern    Not on file   Social History Narrative    Not on file     Current Outpatient Medications   Medication Sig Dispense Refill    buPROPion (WELLBUTRIN XL) 150 MG TB24 tablet TAKE 1 TABLET BY MOUTH EVERY DAY 30 tablet 3    busPIRone (BUSPAR) 10 MG tablet TAKE 1 TABLET BY MOUTH TWICE A DAY 60 tablet 3    lisdexamfetamine (VYVANSE) 70 MG capsule Take 1 capsule (70 mg total) by mouth every morning. 30 capsule 0     No current facility-administered medications for this visit.      Review of patient's allergies indicates:   Allergen Reactions    Sulfa (sulfonamide antibiotics) Anaphylaxis      Past Medical History:   Diagnosis Date    ADD (attention deficit disorder)     Stomach problems     delayed empting     History reviewed. No pertinent surgical history.    Review of Systems   Constitutional: Negative for activity change, chills, fever, irritability and unexpected weight change.   HENT: Negative for congestion, hearing loss, rhinorrhea and trouble swallowing.    Eyes: Negative for discharge and visual disturbance.   Respiratory: Negative for chest tightness, shortness of breath and wheezing.    Cardiovascular: Negative for chest pain and palpitations.   Gastrointestinal: Negative for abdominal pain, blood in stool, constipation, diarrhea, nausea and vomiting.   Endocrine: Negative for polydipsia and polyuria.    Genitourinary: Negative for difficulty urinating, hematuria, impotence and urgency.   Musculoskeletal: Negative for arthralgias, joint swelling and neck pain.   Neurological: Negative for dizziness, weakness and headaches.   Psychiatric/Behavioral: Positive for decreased concentration. Negative for confusion, dysphoric mood and suicidal ideas. The patient is nervous/anxious. The patient does not have insomnia.       OBJECTIVE:      Vitals:    07/08/20 0801   BP: 132/84   BP Location: Left arm   Patient Position: Sitting   BP Method: Large (Manual)   Pulse: 89   Temp: 97.4 °F (36.3 °C)   TempSrc: Temporal   SpO2: 99%   Weight: 108.8 kg (239 lb 12.8 oz)   Height: 6' (1.829 m)     Physical Exam  Vitals signs and nursing note reviewed.   Constitutional:       General: He is not in acute distress.     Appearance: He is well-developed. He is not diaphoretic.   HENT:      Head: Normocephalic and atraumatic.      Mouth/Throat:      Mouth: Mucous membranes are moist.      Pharynx: Uvula midline. No oropharyngeal exudate.   Eyes:      Extraocular Movements: Extraocular movements intact.      Conjunctiva/sclera: Conjunctivae normal.      Pupils: Pupils are equal, round, and reactive to light.   Neck:      Musculoskeletal: Normal range of motion.   Cardiovascular:      Rate and Rhythm: Normal rate and regular rhythm.      Heart sounds: Normal heart sounds. No murmur.   Pulmonary:      Effort: Pulmonary effort is normal.      Breath sounds: Normal breath sounds. No wheezing or rales.   Musculoskeletal:      Right lower leg: No edema.      Left lower leg: No edema.   Lymphadenopathy:      Cervical: No cervical adenopathy.   Skin:     General: Skin is warm and dry.   Neurological:      Mental Status: He is alert and oriented to person, place, and time.   Psychiatric:         Attention and Perception: Attention normal.         Mood and Affect: Mood and affect normal.         Speech: Speech normal.         Behavior: Behavior  normal.         Thought Content: Thought content normal.         Judgment: Judgment normal.        Assessment:       1. Mixed anxiety and depressive disorder    2. Attention deficit disorder (ADD) in adult        Plan:       Mixed anxiety and depressive disorder   *controlled on current regimen- continue as prescribed     Attention deficit disorder (ADD) in adult   * I recommended to decrease his Vyvanse to 60 mg at next refill-patient is agreeable to this and feels this is a good idea to try; discuss long-term plans and he verbalized understanding      Follow up in about 3 months (around 10/8/2020) for f/u ADD.      7/8/2020 JESSICA Parr, FNP

## 2020-08-11 ENCOUNTER — TELEPHONE (OUTPATIENT)
Dept: FAMILY MEDICINE | Facility: CLINIC | Age: 30
End: 2020-08-11

## 2020-08-11 DIAGNOSIS — F98.8 ATTENTION DEFICIT DISORDER (ADD) IN ADULT: ICD-10-CM

## 2020-08-11 RX ORDER — LISDEXAMFETAMINE DIMESYLATE 70 MG/1
70 CAPSULE ORAL EVERY MORNING
Qty: 30 CAPSULE | Refills: 0 | Status: SHIPPED | OUTPATIENT
Start: 2020-08-11 | End: 2020-09-09 | Stop reason: SDUPTHER

## 2020-09-09 DIAGNOSIS — F98.8 ATTENTION DEFICIT DISORDER (ADD) IN ADULT: ICD-10-CM

## 2020-09-09 RX ORDER — LISDEXAMFETAMINE DIMESYLATE 70 MG/1
70 CAPSULE ORAL EVERY MORNING
Qty: 30 CAPSULE | Refills: 0 | Status: SHIPPED | OUTPATIENT
Start: 2020-09-09 | End: 2020-10-16 | Stop reason: SDUPTHER

## 2020-10-16 DIAGNOSIS — F98.8 ATTENTION DEFICIT DISORDER (ADD) IN ADULT: ICD-10-CM

## 2020-10-16 RX ORDER — LISDEXAMFETAMINE DIMESYLATE 70 MG/1
70 CAPSULE ORAL EVERY MORNING
Qty: 30 CAPSULE | Refills: 0 | Status: SHIPPED | OUTPATIENT
Start: 2020-10-16 | End: 2020-11-05 | Stop reason: SDUPTHER

## 2020-11-05 ENCOUNTER — OFFICE VISIT (OUTPATIENT)
Dept: FAMILY MEDICINE | Facility: CLINIC | Age: 30
End: 2020-11-05
Payer: COMMERCIAL

## 2020-11-05 VITALS
SYSTOLIC BLOOD PRESSURE: 122 MMHG | OXYGEN SATURATION: 97 % | BODY MASS INDEX: 32.56 KG/M2 | HEIGHT: 72 IN | HEART RATE: 91 BPM | WEIGHT: 240.38 LBS | TEMPERATURE: 98 F | DIASTOLIC BLOOD PRESSURE: 80 MMHG

## 2020-11-05 DIAGNOSIS — M54.50 ACUTE MIDLINE LOW BACK PAIN WITHOUT SCIATICA: ICD-10-CM

## 2020-11-05 DIAGNOSIS — F98.8 ATTENTION DEFICIT DISORDER (ADD) IN ADULT: ICD-10-CM

## 2020-11-05 DIAGNOSIS — F41.8 MIXED ANXIETY AND DEPRESSIVE DISORDER: Primary | ICD-10-CM

## 2020-11-05 PROCEDURE — 99213 OFFICE O/P EST LOW 20 MIN: CPT | Mod: S$GLB,,, | Performed by: NURSE PRACTITIONER

## 2020-11-05 PROCEDURE — 3008F PR BODY MASS INDEX (BMI) DOCUMENTED: ICD-10-PCS | Mod: S$GLB,,, | Performed by: NURSE PRACTITIONER

## 2020-11-05 PROCEDURE — 3008F BODY MASS INDEX DOCD: CPT | Mod: S$GLB,,, | Performed by: NURSE PRACTITIONER

## 2020-11-05 PROCEDURE — 99213 PR OFFICE/OUTPT VISIT, EST, LEVL III, 20-29 MIN: ICD-10-PCS | Mod: S$GLB,,, | Performed by: NURSE PRACTITIONER

## 2020-11-05 RX ORDER — ACETAMINOPHEN 500 MG
500 TABLET ORAL EVERY 6 HOURS PRN
COMMUNITY
End: 2021-05-27

## 2020-11-05 RX ORDER — LISDEXAMFETAMINE DIMESYLATE 70 MG/1
70 CAPSULE ORAL EVERY MORNING
Qty: 30 CAPSULE | Refills: 0 | Status: SHIPPED | OUTPATIENT
Start: 2020-11-05 | End: 2020-12-21 | Stop reason: SDUPTHER

## 2020-11-05 NOTE — PROGRESS NOTES
SUBJECTIVE:      Patient ID: Paco Upton is a 30 y.o. male.    Chief Complaint: Follow-up and Medication Refill    Established patient here for follow-up on ADHD and medication refills.  He is taking his medications as prescribed daily without side effects or complaints.  He is feeling very good on this medication regimen and wants to continue at his current doses.  He does try to take breaks/drug holidays from [email or web address] on the weekends.  He is complaining of lower back pain since trying to lift something very heavy last week.  He states it is getting better every day and he has been using icy hot a lot.    Back Pain  This is a recurrent problem. The current episode started in the past 7 days. The problem occurs daily. The problem has been waxing and waning since onset. The pain is present in the lumbar spine. The quality of the pain is described as aching and shooting. The pain does not radiate. The pain is at a severity of 6/10. The pain is moderate. The pain is the same all the time. The symptoms are aggravated by bending, position, sitting and twisting. Stiffness is present all day. Pertinent negatives include no abdominal pain, bladder incontinence, bowel incontinence, chest pain, dysuria, fever, headaches, leg pain, numbness, paresis, paresthesias, pelvic pain, perianal numbness, tingling, weakness or weight loss. He has tried analgesics, NSAIDs, bed rest, heat, ice, muscle relaxant and walking for the symptoms. The treatment provided moderate relief.   Anxiety  Presents for follow-up visit. Onset was 1 to 5 years ago. The problem has been gradually worsening. Symptoms include decreased concentration, depressed mood (better) and nervous/anxious behavior. Patient reports no chest pain, confusion, dizziness, excessive worry, feeling of choking, impotence, insomnia, irritability, nausea, obsessions, palpitations, panic (with social activity/events), shortness of breath or suicidal ideas. Symptoms  occur occasionally. The severity of symptoms is mild. The symptoms are aggravated by social activities and work stress. The quality of sleep is good. Nighttime awakenings: none.     There are no known risk factors. His past medical history is significant for anxiety/panic attacks. There is no history of suicide attempts. Past treatments include lifestyle changes. The treatment provided no relief. Compliance with medications is %.       History reviewed. No pertinent family history.   Social History     Socioeconomic History    Marital status:      Spouse name: Not on file    Number of children: Not on file    Years of education: Not on file    Highest education level: Not on file   Occupational History    Not on file   Social Needs    Financial resource strain: Somewhat hard    Food insecurity     Worry: Never true     Inability: Never true    Transportation needs     Medical: No     Non-medical: No   Tobacco Use    Smoking status: Never Smoker   Substance and Sexual Activity    Alcohol use: Yes     Frequency: Monthly or less     Drinks per session: 1 or 2     Binge frequency: Never    Drug use: No    Sexual activity: Not on file   Lifestyle    Physical activity     Days per week: Not on file     Minutes per session: Not on file    Stress: To some extent   Relationships    Social connections     Talks on phone: Twice a week     Gets together: Never     Attends [employer] service: Not on file     Active member of club or organization: No     Attends meetings of clubs or organizations: Never     Relationship status:    Other Topics Concern    Not on file   Social History Narrative    Not on file     Current Outpatient Medications   Medication Sig Dispense Refill    acetaminophen (TYLENOL) 500 MG tablet Take 500 mg by mouth every 6 (six) hours as needed for Pain.      buPROPion (WELLBUTRIN XL) 150 MG TB24 tablet TAKE 1 TABLET BY MOUTH EVERY DAY 30 tablet 3    busPIRone (BUSPAR) 10  MG tablet TAKE 1 TABLET BY MOUTH TWICE A  tablet 1    lisdexamfetamine (VYVANSE) 70 MG capsule Take 1 capsule (70 mg total) by mouth every morning. 30 capsule 0     No current facility-administered medications for this visit.      Review of patient's allergies indicates:   Allergen Reactions    Sulfa (sulfonamide antibiotics) Anaphylaxis      Past Medical History:   Diagnosis Date    ADD (attention deficit disorder)     Stomach problems     delayed empting     History reviewed. No pertinent surgical history.    Review of Systems   Constitutional: Negative for activity change, chills, fatigue, fever, irritability, unexpected weight change and weight loss.   HENT: Negative for congestion, hearing loss, rhinorrhea and trouble swallowing.    Eyes: Negative for pain, discharge and visual disturbance.   Respiratory: Negative for cough, chest tightness, shortness of breath and wheezing.    Cardiovascular: Negative for chest pain and palpitations.   Gastrointestinal: Negative for abdominal pain, bowel incontinence, diarrhea, nausea and vomiting.   Endocrine: Negative for polydipsia and polyuria.   Genitourinary: Negative for bladder incontinence, difficulty urinating, dysuria, frequency, impotence, pelvic pain and urgency.   Musculoskeletal: Positive for back pain. Negative for arthralgias, gait problem, joint swelling, myalgias and neck pain.   Skin: Negative for pallor and rash.   Neurological: Negative for dizziness, tingling, weakness, numbness, headaches and paresthesias.   Hematological: Negative for adenopathy.   Psychiatric/Behavioral: Positive for decreased concentration. Negative for confusion, dysphoric mood, sleep disturbance and suicidal ideas. The patient is nervous/anxious. The patient does not have insomnia.       OBJECTIVE:      Vitals:    11/05/20 1530   BP: 122/80   BP Location: Left arm   Patient Position: Sitting   BP Method: Large (Manual)   Pulse: 91   Temp: 97.5 °F (36.4 °C)   TempSrc:  Temporal   SpO2: 97%   Weight: 109 kg (240 lb 6.4 oz)   Height: 6' (1.829 m)     Physical Exam  Vitals signs and nursing note reviewed.   Constitutional:       General: He is not in acute distress.     Appearance: Normal appearance. He is well-developed. He is obese. He is not diaphoretic.   HENT:      Head: Normocephalic and atraumatic.      Mouth/Throat:      Mouth: Mucous membranes are moist.      Pharynx: Uvula midline. No oropharyngeal exudate.   Eyes:      General: No scleral icterus.     Extraocular Movements: Extraocular movements intact.      Conjunctiva/sclera: Conjunctivae normal.      Pupils: Pupils are equal, round, and reactive to light.   Neck:      Musculoskeletal: Normal range of motion and neck supple.      Vascular: No carotid bruit.   Cardiovascular:      Rate and Rhythm: Normal rate and regular rhythm.      Heart sounds: Normal heart sounds. No murmur.   Pulmonary:      Effort: Pulmonary effort is normal.      Breath sounds: Normal breath sounds. No wheezing or rales.   Abdominal:      General: Bowel sounds are normal.      Palpations: Abdomen is soft.      Tenderness: There is no abdominal tenderness.   Musculoskeletal:      Lumbar back: He exhibits decreased range of motion and tenderness. He exhibits no swelling, no edema, no deformity, no laceration and no spasm.      Right lower leg: No edema.      Left lower leg: No edema.   Lymphadenopathy:      Cervical: No cervical adenopathy.   Skin:     General: Skin is warm and dry.      Coloration: Skin is not pale.   Neurological:      Mental Status: He is alert and oriented to person, place, and time.      Coordination: Coordination normal.      Gait: Gait normal.   Psychiatric:         Attention and Perception: Attention normal.         Mood and Affect: Mood and affect normal.         Speech: Speech normal.         Behavior: Behavior normal.         Thought Content: Thought content normal.         Judgment: Judgment normal.        Assessment:        1. Mixed anxiety and depressive disorder    2. Attention deficit disorder (ADD) in adult    3. Acute midline low back pain without sciatica        Plan:       Mixed anxiety and depressive disorder   *controlled on meds     Attention deficit disorder (ADD) in adult  -     lisdexamfetamine (VYVANSE) 70 MG capsule; Take 1 capsule (70 mg total) by mouth every morning.  Dispense: 30 capsule; Refill: 0    *stable on meds-encouraged decrease use/take holidays when able     Acute midline low back pain without sciatica   *resolving- continue otc meds and ice/heat       Follow up in about 3 months (around 2/5/2021) for annual, f/u ADHD.      11/5/2020 JESSICA Parr, FNP        Answers for HPI/ROS submitted by the patient on 11/5/2020   Back pain  genital pain: No

## 2020-12-21 DIAGNOSIS — F98.8 ATTENTION DEFICIT DISORDER (ADD) IN ADULT: ICD-10-CM

## 2020-12-21 RX ORDER — LISDEXAMFETAMINE DIMESYLATE 70 MG/1
70 CAPSULE ORAL EVERY MORNING
Qty: 30 CAPSULE | Refills: 0 | Status: SHIPPED | OUTPATIENT
Start: 2020-12-21 | End: 2021-01-20 | Stop reason: SDUPTHER

## 2021-01-20 DIAGNOSIS — F98.8 ATTENTION DEFICIT DISORDER (ADD) IN ADULT: ICD-10-CM

## 2021-01-20 RX ORDER — LISDEXAMFETAMINE DIMESYLATE 70 MG/1
70 CAPSULE ORAL EVERY MORNING
Qty: 30 CAPSULE | Refills: 0 | Status: SHIPPED | OUTPATIENT
Start: 2021-01-20 | End: 2021-02-09 | Stop reason: SDUPTHER

## 2021-02-09 ENCOUNTER — OFFICE VISIT (OUTPATIENT)
Dept: FAMILY MEDICINE | Facility: CLINIC | Age: 31
End: 2021-02-09
Payer: COMMERCIAL

## 2021-02-09 VITALS
HEART RATE: 84 BPM | HEIGHT: 72 IN | BODY MASS INDEX: 32.76 KG/M2 | WEIGHT: 241.88 LBS | SYSTOLIC BLOOD PRESSURE: 130 MMHG | DIASTOLIC BLOOD PRESSURE: 88 MMHG | OXYGEN SATURATION: 99 % | TEMPERATURE: 98 F

## 2021-02-09 DIAGNOSIS — F41.8 MIXED ANXIETY AND DEPRESSIVE DISORDER: ICD-10-CM

## 2021-02-09 DIAGNOSIS — F98.8 ATTENTION DEFICIT DISORDER (ADD) IN ADULT: ICD-10-CM

## 2021-02-09 DIAGNOSIS — Z00.00 ENCOUNTER FOR ROUTINE ADULT HEALTH EXAMINATION WITHOUT ABNORMAL FINDINGS: Primary | ICD-10-CM

## 2021-02-09 DIAGNOSIS — Z23 IMMUNIZATION DUE: ICD-10-CM

## 2021-02-09 PROCEDURE — 90715 TDAP VACCINE GREATER THAN OR EQUAL TO 7YO IM: ICD-10-PCS | Mod: S$GLB,,, | Performed by: NURSE PRACTITIONER

## 2021-02-09 PROCEDURE — 99395 PREV VISIT EST AGE 18-39: CPT | Mod: 25,S$GLB,, | Performed by: NURSE PRACTITIONER

## 2021-02-09 PROCEDURE — 90715 TDAP VACCINE 7 YRS/> IM: CPT | Mod: S$GLB,,, | Performed by: NURSE PRACTITIONER

## 2021-02-09 PROCEDURE — 90471 IMMUNIZATION ADMIN: CPT | Mod: S$GLB,,, | Performed by: NURSE PRACTITIONER

## 2021-02-09 PROCEDURE — 99395 PR PREVENTIVE VISIT,EST,18-39: ICD-10-PCS | Mod: 25,S$GLB,, | Performed by: NURSE PRACTITIONER

## 2021-02-09 PROCEDURE — 90471 TDAP VACCINE GREATER THAN OR EQUAL TO 7YO IM: ICD-10-PCS | Mod: S$GLB,,, | Performed by: NURSE PRACTITIONER

## 2021-02-09 RX ORDER — LISDEXAMFETAMINE DIMESYLATE 70 MG/1
70 CAPSULE ORAL EVERY MORNING
Qty: 30 CAPSULE | Refills: 0 | Status: SHIPPED | OUTPATIENT
Start: 2021-02-09 | End: 2021-03-21 | Stop reason: SDUPTHER

## 2021-05-10 ENCOUNTER — PATIENT MESSAGE (OUTPATIENT)
Dept: RESEARCH | Facility: HOSPITAL | Age: 31
End: 2021-05-10

## 2021-05-22 DIAGNOSIS — F98.8 ATTENTION DEFICIT DISORDER (ADD) IN ADULT: ICD-10-CM

## 2021-05-24 DIAGNOSIS — F98.8 ATTENTION DEFICIT DISORDER (ADD) IN ADULT: ICD-10-CM

## 2021-05-24 RX ORDER — LISDEXAMFETAMINE DIMESYLATE 70 MG/1
70 CAPSULE ORAL EVERY MORNING
Qty: 30 CAPSULE | Refills: 0 | Status: CANCELLED | OUTPATIENT
Start: 2021-05-24

## 2021-05-25 ENCOUNTER — PATIENT MESSAGE (OUTPATIENT)
Dept: FAMILY MEDICINE | Facility: CLINIC | Age: 31
End: 2021-05-25

## 2021-05-25 RX ORDER — LISDEXAMFETAMINE DIMESYLATE 70 MG/1
70 CAPSULE ORAL EVERY MORNING
Qty: 30 CAPSULE | Refills: 0 | Status: SHIPPED | OUTPATIENT
Start: 2021-05-25 | End: 2021-06-24 | Stop reason: SDUPTHER

## 2021-05-27 ENCOUNTER — OFFICE VISIT (OUTPATIENT)
Dept: FAMILY MEDICINE | Facility: CLINIC | Age: 31
End: 2021-05-27
Payer: COMMERCIAL

## 2021-05-27 ENCOUNTER — LAB VISIT (OUTPATIENT)
Dept: LAB | Facility: HOSPITAL | Age: 31
End: 2021-05-27
Attending: NURSE PRACTITIONER
Payer: COMMERCIAL

## 2021-05-27 VITALS
WEIGHT: 238.19 LBS | RESPIRATION RATE: 18 BRPM | DIASTOLIC BLOOD PRESSURE: 84 MMHG | OXYGEN SATURATION: 98 % | TEMPERATURE: 98 F | HEART RATE: 66 BPM | SYSTOLIC BLOOD PRESSURE: 120 MMHG | BODY MASS INDEX: 32.26 KG/M2 | HEIGHT: 72 IN

## 2021-05-27 DIAGNOSIS — Z00.00 ENCOUNTER FOR ROUTINE ADULT HEALTH EXAMINATION WITHOUT ABNORMAL FINDINGS: ICD-10-CM

## 2021-05-27 DIAGNOSIS — F98.8 ATTENTION DEFICIT DISORDER (ADD) IN ADULT: Primary | ICD-10-CM

## 2021-05-27 LAB
ALBUMIN SERPL BCP-MCNC: 4.2 G/DL (ref 3.5–5.2)
ALP SERPL-CCNC: 74 U/L (ref 55–135)
ALT SERPL W/O P-5'-P-CCNC: 39 U/L (ref 10–44)
ANION GAP SERPL CALC-SCNC: 12 MMOL/L (ref 8–16)
AST SERPL-CCNC: 22 U/L (ref 10–40)
BASOPHILS # BLD AUTO: 0.06 K/UL (ref 0–0.2)
BASOPHILS NFR BLD: 0.9 % (ref 0–1.9)
BILIRUB SERPL-MCNC: 0.7 MG/DL (ref 0.1–1)
BUN SERPL-MCNC: 22 MG/DL (ref 6–20)
CALCIUM SERPL-MCNC: 9.1 MG/DL (ref 8.7–10.5)
CHLORIDE SERPL-SCNC: 100 MMOL/L (ref 95–110)
CHOLEST SERPL-MCNC: 157 MG/DL (ref 120–199)
CHOLEST/HDLC SERPL: 4.6 {RATIO} (ref 2–5)
CO2 SERPL-SCNC: 25 MMOL/L (ref 23–29)
CREAT SERPL-MCNC: 1 MG/DL (ref 0.5–1.4)
DIFFERENTIAL METHOD: NORMAL
EOSINOPHIL # BLD AUTO: 0.1 K/UL (ref 0–0.5)
EOSINOPHIL NFR BLD: 1.4 % (ref 0–8)
ERYTHROCYTE [DISTWIDTH] IN BLOOD BY AUTOMATED COUNT: 12.5 % (ref 11.5–14.5)
EST. GFR  (AFRICAN AMERICAN): >60 ML/MIN/1.73 M^2
EST. GFR  (NON AFRICAN AMERICAN): >60 ML/MIN/1.73 M^2
GLUCOSE SERPL-MCNC: 107 MG/DL (ref 70–110)
HCT VFR BLD AUTO: 42.7 % (ref 40–54)
HDLC SERPL-MCNC: 34 MG/DL (ref 40–75)
HDLC SERPL: 21.7 % (ref 20–50)
HGB BLD-MCNC: 14.7 G/DL (ref 14–18)
IMM GRANULOCYTES # BLD AUTO: 0.03 K/UL (ref 0–0.04)
IMM GRANULOCYTES NFR BLD AUTO: 0.5 % (ref 0–0.5)
LDLC SERPL CALC-MCNC: 107.6 MG/DL (ref 63–159)
LYMPHOCYTES # BLD AUTO: 2.3 K/UL (ref 1–4.8)
LYMPHOCYTES NFR BLD: 35.6 % (ref 18–48)
MCH RBC QN AUTO: 29 PG (ref 27–31)
MCHC RBC AUTO-ENTMCNC: 34.4 G/DL (ref 32–36)
MCV RBC AUTO: 84 FL (ref 82–98)
MONOCYTES # BLD AUTO: 0.5 K/UL (ref 0.3–1)
MONOCYTES NFR BLD: 7.8 % (ref 4–15)
NEUTROPHILS # BLD AUTO: 3.5 K/UL (ref 1.8–7.7)
NEUTROPHILS NFR BLD: 53.8 % (ref 38–73)
NONHDLC SERPL-MCNC: 123 MG/DL
NRBC BLD-RTO: 0 /100 WBC
PLATELET # BLD AUTO: 229 K/UL (ref 150–450)
PMV BLD AUTO: 9.5 FL (ref 9.2–12.9)
POTASSIUM SERPL-SCNC: 3.9 MMOL/L (ref 3.5–5.1)
PROT SERPL-MCNC: 7.4 G/DL (ref 6–8.4)
RBC # BLD AUTO: 5.07 M/UL (ref 4.6–6.2)
SODIUM SERPL-SCNC: 137 MMOL/L (ref 136–145)
TRIGL SERPL-MCNC: 77 MG/DL (ref 30–150)
TSH SERPL DL<=0.005 MIU/L-ACNC: 1.46 UIU/ML (ref 0.34–5.6)
WBC # BLD AUTO: 6.52 K/UL (ref 3.9–12.7)

## 2021-05-27 PROCEDURE — 85025 COMPLETE CBC W/AUTO DIFF WBC: CPT | Performed by: NURSE PRACTITIONER

## 2021-05-27 PROCEDURE — 3008F BODY MASS INDEX DOCD: CPT | Mod: S$GLB,,, | Performed by: NURSE PRACTITIONER

## 2021-05-27 PROCEDURE — 1126F AMNT PAIN NOTED NONE PRSNT: CPT | Mod: S$GLB,,, | Performed by: NURSE PRACTITIONER

## 2021-05-27 PROCEDURE — 99213 OFFICE O/P EST LOW 20 MIN: CPT | Mod: S$GLB,,, | Performed by: NURSE PRACTITIONER

## 2021-05-27 PROCEDURE — 1126F PR PAIN SEVERITY QUANTIFIED, NO PAIN PRESENT: ICD-10-PCS | Mod: S$GLB,,, | Performed by: NURSE PRACTITIONER

## 2021-05-27 PROCEDURE — 84443 ASSAY THYROID STIM HORMONE: CPT | Performed by: NURSE PRACTITIONER

## 2021-05-27 PROCEDURE — 3008F PR BODY MASS INDEX (BMI) DOCUMENTED: ICD-10-PCS | Mod: S$GLB,,, | Performed by: NURSE PRACTITIONER

## 2021-05-27 PROCEDURE — 99213 PR OFFICE/OUTPT VISIT, EST, LEVL III, 20-29 MIN: ICD-10-PCS | Mod: S$GLB,,, | Performed by: NURSE PRACTITIONER

## 2021-05-27 PROCEDURE — 80053 COMPREHEN METABOLIC PANEL: CPT | Performed by: NURSE PRACTITIONER

## 2021-05-27 PROCEDURE — 36415 COLL VENOUS BLD VENIPUNCTURE: CPT | Performed by: NURSE PRACTITIONER

## 2021-05-27 PROCEDURE — 80061 LIPID PANEL: CPT | Performed by: NURSE PRACTITIONER

## 2021-05-27 RX ORDER — CETIRIZINE HYDROCHLORIDE 10 MG/1
CAPSULE, LIQUID FILLED ORAL
COMMUNITY

## 2021-06-24 DIAGNOSIS — F98.8 ATTENTION DEFICIT DISORDER (ADD) IN ADULT: ICD-10-CM

## 2021-06-25 ENCOUNTER — PATIENT MESSAGE (OUTPATIENT)
Dept: FAMILY MEDICINE | Facility: CLINIC | Age: 31
End: 2021-06-25

## 2021-06-25 RX ORDER — LISDEXAMFETAMINE DIMESYLATE 70 MG/1
70 CAPSULE ORAL EVERY MORNING
Qty: 30 CAPSULE | Refills: 0 | Status: SHIPPED | OUTPATIENT
Start: 2021-06-25 | End: 2021-07-26 | Stop reason: SDUPTHER

## 2021-09-07 ENCOUNTER — OFFICE VISIT (OUTPATIENT)
Dept: FAMILY MEDICINE | Facility: CLINIC | Age: 31
End: 2021-09-07
Payer: COMMERCIAL

## 2021-09-07 VITALS
OXYGEN SATURATION: 97 % | HEART RATE: 98 BPM | BODY MASS INDEX: 34.4 KG/M2 | SYSTOLIC BLOOD PRESSURE: 130 MMHG | DIASTOLIC BLOOD PRESSURE: 86 MMHG | WEIGHT: 254 LBS | HEIGHT: 72 IN

## 2021-09-07 DIAGNOSIS — F98.8 ATTENTION DEFICIT DISORDER (ADD) IN ADULT: Primary | ICD-10-CM

## 2021-09-07 DIAGNOSIS — F41.8 MIXED ANXIETY AND DEPRESSIVE DISORDER: ICD-10-CM

## 2021-09-07 PROCEDURE — 3075F SYST BP GE 130 - 139MM HG: CPT | Mod: S$GLB,,, | Performed by: NURSE PRACTITIONER

## 2021-09-07 PROCEDURE — 3008F BODY MASS INDEX DOCD: CPT | Mod: S$GLB,,, | Performed by: NURSE PRACTITIONER

## 2021-09-07 PROCEDURE — 1160F PR REVIEW ALL MEDS BY PRESCRIBER/CLIN PHARMACIST DOCUMENTED: ICD-10-PCS | Mod: S$GLB,,, | Performed by: NURSE PRACTITIONER

## 2021-09-07 PROCEDURE — 3075F PR MOST RECENT SYSTOLIC BLOOD PRESS GE 130-139MM HG: ICD-10-PCS | Mod: S$GLB,,, | Performed by: NURSE PRACTITIONER

## 2021-09-07 PROCEDURE — 99213 OFFICE O/P EST LOW 20 MIN: CPT | Mod: S$GLB,,, | Performed by: NURSE PRACTITIONER

## 2021-09-07 PROCEDURE — 99213 PR OFFICE/OUTPT VISIT, EST, LEVL III, 20-29 MIN: ICD-10-PCS | Mod: S$GLB,,, | Performed by: NURSE PRACTITIONER

## 2021-09-07 PROCEDURE — 3079F PR MOST RECENT DIASTOLIC BLOOD PRESSURE 80-89 MM HG: ICD-10-PCS | Mod: S$GLB,,, | Performed by: NURSE PRACTITIONER

## 2021-09-07 PROCEDURE — 3008F PR BODY MASS INDEX (BMI) DOCUMENTED: ICD-10-PCS | Mod: S$GLB,,, | Performed by: NURSE PRACTITIONER

## 2021-09-07 PROCEDURE — 3079F DIAST BP 80-89 MM HG: CPT | Mod: S$GLB,,, | Performed by: NURSE PRACTITIONER

## 2021-09-07 PROCEDURE — 1160F RVW MEDS BY RX/DR IN RCRD: CPT | Mod: S$GLB,,, | Performed by: NURSE PRACTITIONER

## 2021-09-07 RX ORDER — BUSPIRONE HYDROCHLORIDE 10 MG/1
10 TABLET ORAL 2 TIMES DAILY
Qty: 180 TABLET | Refills: 1 | Status: SHIPPED | OUTPATIENT
Start: 2021-09-07 | End: 2022-03-22

## 2021-09-07 RX ORDER — LISDEXAMFETAMINE DIMESYLATE 70 MG/1
70 CAPSULE ORAL EVERY MORNING
Qty: 30 CAPSULE | Refills: 0 | Status: SHIPPED | OUTPATIENT
Start: 2021-09-07 | End: 2021-09-10 | Stop reason: SDUPTHER

## 2021-09-10 ENCOUNTER — PATIENT MESSAGE (OUTPATIENT)
Dept: FAMILY MEDICINE | Facility: CLINIC | Age: 31
End: 2021-09-10

## 2021-09-10 DIAGNOSIS — F98.8 ATTENTION DEFICIT DISORDER (ADD) IN ADULT: ICD-10-CM

## 2021-09-13 RX ORDER — LISDEXAMFETAMINE DIMESYLATE 70 MG/1
70 CAPSULE ORAL EVERY MORNING
Qty: 30 CAPSULE | Refills: 0 | Status: SHIPPED | OUTPATIENT
Start: 2021-09-13 | End: 2021-10-12 | Stop reason: SDUPTHER

## 2021-10-02 ENCOUNTER — PATIENT MESSAGE (OUTPATIENT)
Dept: FAMILY MEDICINE | Facility: CLINIC | Age: 31
End: 2021-10-02

## 2021-10-02 DIAGNOSIS — Z91.09 ENVIRONMENTAL ALLERGIES: Primary | ICD-10-CM

## 2021-10-04 ENCOUNTER — PATIENT MESSAGE (OUTPATIENT)
Dept: FAMILY MEDICINE | Facility: CLINIC | Age: 31
End: 2021-10-04

## 2021-10-04 RX ORDER — MONTELUKAST SODIUM 10 MG/1
10 TABLET ORAL NIGHTLY
Qty: 30 TABLET | Refills: 2 | Status: SHIPPED | OUTPATIENT
Start: 2021-10-04 | End: 2021-12-30

## 2021-10-12 DIAGNOSIS — F98.8 ATTENTION DEFICIT DISORDER (ADD) IN ADULT: ICD-10-CM

## 2021-10-12 RX ORDER — LISDEXAMFETAMINE DIMESYLATE 70 MG/1
70 CAPSULE ORAL EVERY MORNING
Qty: 30 CAPSULE | Refills: 0 | Status: SHIPPED | OUTPATIENT
Start: 2021-10-12 | End: 2021-11-11 | Stop reason: SDUPTHER

## 2021-11-11 DIAGNOSIS — F98.8 ATTENTION DEFICIT DISORDER (ADD) IN ADULT: ICD-10-CM

## 2021-11-12 RX ORDER — LISDEXAMFETAMINE DIMESYLATE 70 MG/1
70 CAPSULE ORAL EVERY MORNING
Qty: 30 CAPSULE | Refills: 0 | Status: SHIPPED | OUTPATIENT
Start: 2021-11-12 | End: 2021-12-19 | Stop reason: SDUPTHER

## 2021-12-19 DIAGNOSIS — F98.8 ATTENTION DEFICIT DISORDER (ADD) IN ADULT: ICD-10-CM

## 2021-12-21 RX ORDER — LISDEXAMFETAMINE DIMESYLATE 70 MG/1
70 CAPSULE ORAL EVERY MORNING
Qty: 30 CAPSULE | Refills: 0 | Status: SHIPPED | OUTPATIENT
Start: 2021-12-21 | End: 2022-01-25 | Stop reason: SDUPTHER

## 2021-12-23 ENCOUNTER — PATIENT MESSAGE (OUTPATIENT)
Dept: FAMILY MEDICINE | Facility: CLINIC | Age: 31
End: 2021-12-23
Payer: COMMERCIAL

## 2021-12-27 ENCOUNTER — PATIENT MESSAGE (OUTPATIENT)
Dept: FAMILY MEDICINE | Facility: CLINIC | Age: 31
End: 2021-12-27
Payer: COMMERCIAL

## 2022-01-25 ENCOUNTER — IMMUNIZATION (OUTPATIENT)
Dept: PRIMARY CARE CLINIC | Facility: CLINIC | Age: 32
End: 2022-01-25

## 2022-01-25 ENCOUNTER — OFFICE VISIT (OUTPATIENT)
Dept: FAMILY MEDICINE | Facility: CLINIC | Age: 32
End: 2022-01-25
Payer: COMMERCIAL

## 2022-01-25 VITALS
TEMPERATURE: 98 F | HEIGHT: 72 IN | BODY MASS INDEX: 34.43 KG/M2 | DIASTOLIC BLOOD PRESSURE: 80 MMHG | OXYGEN SATURATION: 98 % | RESPIRATION RATE: 18 BRPM | HEART RATE: 95 BPM | WEIGHT: 254.19 LBS | SYSTOLIC BLOOD PRESSURE: 130 MMHG

## 2022-01-25 DIAGNOSIS — Z91.09 ENVIRONMENTAL ALLERGIES: ICD-10-CM

## 2022-01-25 DIAGNOSIS — F41.8 MIXED ANXIETY AND DEPRESSIVE DISORDER: ICD-10-CM

## 2022-01-25 DIAGNOSIS — F98.8 ATTENTION DEFICIT DISORDER (ADD) IN ADULT: ICD-10-CM

## 2022-01-25 DIAGNOSIS — Z23 NEED FOR VACCINATION: Primary | ICD-10-CM

## 2022-01-25 DIAGNOSIS — E66.09 CLASS 1 OBESITY DUE TO EXCESS CALORIES WITHOUT SERIOUS COMORBIDITY WITH BODY MASS INDEX (BMI) OF 34.0 TO 34.9 IN ADULT: ICD-10-CM

## 2022-01-25 DIAGNOSIS — Z00.00 ENCOUNTER FOR ROUTINE ADULT HEALTH EXAMINATION WITHOUT ABNORMAL FINDINGS: Primary | ICD-10-CM

## 2022-01-25 PROCEDURE — 99395 PR PREVENTIVE VISIT,EST,18-39: ICD-10-PCS | Mod: S$GLB,,, | Performed by: NURSE PRACTITIONER

## 2022-01-25 PROCEDURE — 0001A COVID-19, MRNA, LNP-S, PF, 30 MCG/0.3 ML DOSE VACCINE: CPT | Mod: S$GLB,,, | Performed by: FAMILY MEDICINE

## 2022-01-25 PROCEDURE — 91300 COVID-19, MRNA, LNP-S, PF, 30 MCG/0.3 ML DOSE VACCINE: ICD-10-PCS | Mod: S$GLB,,, | Performed by: FAMILY MEDICINE

## 2022-01-25 PROCEDURE — 3079F PR MOST RECENT DIASTOLIC BLOOD PRESSURE 80-89 MM HG: ICD-10-PCS | Mod: S$GLB,,, | Performed by: NURSE PRACTITIONER

## 2022-01-25 PROCEDURE — 3075F SYST BP GE 130 - 139MM HG: CPT | Mod: S$GLB,,, | Performed by: NURSE PRACTITIONER

## 2022-01-25 PROCEDURE — 1160F PR REVIEW ALL MEDS BY PRESCRIBER/CLIN PHARMACIST DOCUMENTED: ICD-10-PCS | Mod: S$GLB,,, | Performed by: NURSE PRACTITIONER

## 2022-01-25 PROCEDURE — 1160F RVW MEDS BY RX/DR IN RCRD: CPT | Mod: S$GLB,,, | Performed by: NURSE PRACTITIONER

## 2022-01-25 PROCEDURE — 0001A COVID-19, MRNA, LNP-S, PF, 30 MCG/0.3 ML DOSE VACCINE: ICD-10-PCS | Mod: S$GLB,,, | Performed by: FAMILY MEDICINE

## 2022-01-25 PROCEDURE — 3079F DIAST BP 80-89 MM HG: CPT | Mod: S$GLB,,, | Performed by: NURSE PRACTITIONER

## 2022-01-25 PROCEDURE — 3008F PR BODY MASS INDEX (BMI) DOCUMENTED: ICD-10-PCS | Mod: S$GLB,,, | Performed by: NURSE PRACTITIONER

## 2022-01-25 PROCEDURE — 99395 PREV VISIT EST AGE 18-39: CPT | Mod: S$GLB,,, | Performed by: NURSE PRACTITIONER

## 2022-01-25 PROCEDURE — 3075F PR MOST RECENT SYSTOLIC BLOOD PRESS GE 130-139MM HG: ICD-10-PCS | Mod: S$GLB,,, | Performed by: NURSE PRACTITIONER

## 2022-01-25 PROCEDURE — 3008F BODY MASS INDEX DOCD: CPT | Mod: S$GLB,,, | Performed by: NURSE PRACTITIONER

## 2022-01-25 PROCEDURE — 91300 COVID-19, MRNA, LNP-S, PF, 30 MCG/0.3 ML DOSE VACCINE: CPT | Mod: S$GLB,,, | Performed by: FAMILY MEDICINE

## 2022-01-25 RX ORDER — MONTELUKAST SODIUM 10 MG/1
10 TABLET ORAL NIGHTLY
Qty: 90 TABLET | Refills: 1 | Status: SHIPPED | OUTPATIENT
Start: 2022-01-25 | End: 2022-09-02

## 2022-01-25 RX ORDER — LISDEXAMFETAMINE DIMESYLATE 70 MG/1
70 CAPSULE ORAL EVERY MORNING
Qty: 30 CAPSULE | Refills: 0 | Status: SHIPPED | OUTPATIENT
Start: 2022-01-25 | End: 2022-02-24 | Stop reason: SDUPTHER

## 2022-01-25 NOTE — PROGRESS NOTES
SUBJECTIVE:   HPI: Paco Upton  is a 31 y.o. male who presents for annual physical .   Annual Exam    Established patient here for annual exam and physical.  He is taking his medications for anxiety, depression, and ADHD has prescribed without side effects complaints.  Patient does try to take breaks from his Vyvanse when he is able to on the weekends and during holiday breaks.  He denies any chest pain, shortness of breath, palpitations, edema, or headaches.  His labs are due in May.  Vaccinations reviewed with patient- got 1st COVID vaccination today.  Denies any complaints or concerns at this time.    (Not in a hospital admission)    Review of patient's allergies indicates:   Allergen Reactions    Sulfa (sulfonamide antibiotics) Anaphylaxis     Current Outpatient Medications on File Prior to Visit   Medication Sig Dispense Refill    buPROPion (WELLBUTRIN XL) 150 MG TB24 tablet TAKE 1 TABLET BY MOUTH EVERY DAY 90 tablet 1    busPIRone (BUSPAR) 10 MG tablet Take 1 tablet (10 mg total) by mouth 2 (two) times daily. 180 tablet 1    cetirizine (ZYRTEC) 10 mg Cap       [DISCONTINUED] lisdexamfetamine (VYVANSE) 70 MG capsule Take 1 capsule (70 mg total) by mouth every morning. 30 capsule 0    [DISCONTINUED] montelukast (SINGULAIR) 10 mg tablet TAKE 1 TABLET BY MOUTH EVERY DAY IN THE EVENING 90 tablet 0     No current facility-administered medications on file prior to visit.     Past Medical History:   Diagnosis Date    ADD (attention deficit disorder)     Stomach problems     delayed empting     History reviewed. No pertinent surgical history.  History reviewed. No pertinent family history.  Social History     Tobacco Use    Smoking status: Never Smoker    Smokeless tobacco: Never Used   Substance Use Topics    Alcohol use: Yes    Drug use: No      Health Maintenance Topics with due status: Not Due       Topic Last Completion Date    TETANUS VACCINE 02/09/2021     Immunization History   Administered  Date(s) Administered    COVID-19, MRNA, LN-S, PF (Pfizer) (Purple Cap) 01/25/2022    DTaP 1990, 03/18/1991, 03/04/1992, 04/08/1994, 11/07/1994    HIB 1990, 03/18/1991, 06/24/1991, 01/01/1992    Hepatitis B, Pediatric/Adolescent 05/04/2004, 05/19/2005, 08/09/2005    IPV 1990, 1990, 03/18/1991, 03/04/1992, 11/07/1994    MMR 11/15/1991, 11/07/1994    Meningococcal Conjugate (MCV4P) 08/14/2009    Td (ADULT) 05/04/2004    Tdap 08/14/2009, 02/09/2021       Review of Systems   Constitutional: Negative for activity change, appetite change, chills, fatigue, fever and unexpected weight change.   HENT: Negative for congestion, ear discharge, ear pain, hearing loss, rhinorrhea, sore throat and trouble swallowing.    Eyes: Negative for pain, discharge and visual disturbance.   Respiratory: Negative for cough, chest tightness, shortness of breath and wheezing.    Cardiovascular: Negative for chest pain, palpitations and leg swelling.   Gastrointestinal: Negative for abdominal pain, blood in stool, constipation, diarrhea, nausea and vomiting.   Endocrine: Negative for cold intolerance, heat intolerance, polydipsia and polyuria.   Genitourinary: Negative for difficulty urinating, dysuria, frequency, hematuria and urgency.   Musculoskeletal: Negative for arthralgias, back pain, joint swelling and neck pain.   Skin: Negative for pallor and rash.   Neurological: Negative for dizziness, weakness, numbness and headaches.   Hematological: Negative for adenopathy.   Psychiatric/Behavioral: Positive for decreased concentration. Negative for confusion, dysphoric mood, sleep disturbance and suicidal ideas. The patient is not nervous/anxious.       OBJECTIVE:      Vitals:    01/25/22 1519   BP: 130/80   BP Location: Right arm   Patient Position: Sitting   BP Method: Large (Manual)   Pulse: 95   Resp: 18   Temp: 98.2 °F (36.8 °C)   TempSrc: Oral   SpO2: 98%   Weight: 115.3 kg (254 lb 3.2 oz)   Height: 6' (1.829  m)     Physical Exam  Vitals and nursing note reviewed.   Constitutional:       General: He is not in acute distress.     Appearance: Normal appearance. He is well-developed. He is obese. He is not ill-appearing or diaphoretic.   HENT:      Head: Normocephalic and atraumatic.      Right Ear: Tympanic membrane, ear canal and external ear normal.      Left Ear: Tympanic membrane, ear canal and external ear normal.      Nose: Nose normal.      Mouth/Throat:      Mouth: Mucous membranes are moist.      Pharynx: Oropharynx is clear. Uvula midline. No oropharyngeal exudate or posterior oropharyngeal erythema.   Eyes:      General: No scleral icterus.     Extraocular Movements: Extraocular movements intact.      Conjunctiva/sclera: Conjunctivae normal.      Pupils: Pupils are equal, round, and reactive to light.   Neck:      Thyroid: No thyromegaly.   Cardiovascular:      Rate and Rhythm: Normal rate and regular rhythm.      Pulses: Normal pulses.      Heart sounds: Normal heart sounds. No murmur heard.      Pulmonary:      Effort: Pulmonary effort is normal.      Breath sounds: Normal breath sounds. No wheezing or rales.   Abdominal:      General: Bowel sounds are normal. There is no distension.      Palpations: Abdomen is soft.      Tenderness: There is no abdominal tenderness.   Musculoskeletal:         General: Normal range of motion.      Cervical back: Normal range of motion and neck supple.      Right lower leg: No edema.      Left lower leg: No edema.   Lymphadenopathy:      Cervical: No cervical adenopathy.   Skin:     General: Skin is warm and dry.      Coloration: Skin is not jaundiced or pale.   Neurological:      Mental Status: He is alert and oriented to person, place, and time. Mental status is at baseline.      Gait: Gait normal.   Psychiatric:         Attention and Perception: Attention normal.         Mood and Affect: Mood and affect normal.         Speech: Speech normal.         Behavior: Behavior  normal.         Thought Content: Thought content normal.         Judgment: Judgment normal.        Assessment:       1. Encounter for routine adult health examination without abnormal findings    2. Attention deficit disorder (ADD) in adult    3. Mixed anxiety and depressive disorder    4. Environmental allergies    5. Class 1 obesity due to excess calories without serious comorbidity with body mass index (BMI) of 34.0 to 34.9 in adult        Plan:       Encounter for routine adult health examination without abnormal findings    Attention deficit disorder (ADD) in adult  -     lisdexamfetamine (VYVANSE) 70 MG capsule; Take 1 capsule (70 mg total) by mouth every morning.  Dispense: 30 capsule; Refill: 0    Mixed anxiety and depressive disorder    Environmental allergies  -     montelukast (SINGULAIR) 10 mg tablet; Take 1 tablet (10 mg total) by mouth every evening.  Dispense: 90 tablet; Refill: 1    Class 1 obesity due to excess calories without serious comorbidity with body mass index (BMI) of 34.0 to 34.9 in adult        Counseled on age and gender appropriate medical preventative services, including cancer screenings, immunizations, overall nutritional health, need for a consistent exercise regimen and an overall push towards maintaining a vigorous and active lifestyle.      Follow up in about 3 months (around 4/25/2022) for f/u ADHD .        This note was created using [employer] voice recognition software that occasionally misinterprets phrases or words.

## 2022-01-31 ENCOUNTER — PATIENT MESSAGE (OUTPATIENT)
Dept: FAMILY MEDICINE | Facility: CLINIC | Age: 32
End: 2022-01-31
Payer: COMMERCIAL

## 2022-02-02 ENCOUNTER — PATIENT MESSAGE (OUTPATIENT)
Dept: FAMILY MEDICINE | Facility: CLINIC | Age: 32
End: 2022-02-02
Payer: COMMERCIAL

## 2022-02-15 ENCOUNTER — IMMUNIZATION (OUTPATIENT)
Dept: PRIMARY CARE CLINIC | Facility: CLINIC | Age: 32
End: 2022-02-15

## 2022-02-15 DIAGNOSIS — Z23 NEED FOR VACCINATION: Primary | ICD-10-CM

## 2022-02-15 PROCEDURE — 0002A COVID-19, MRNA, LNP-S, PF, 30 MCG/0.3 ML DOSE VACCINE: CPT | Mod: S$GLB,,, | Performed by: FAMILY MEDICINE

## 2022-02-15 PROCEDURE — 0002A COVID-19, MRNA, LNP-S, PF, 30 MCG/0.3 ML DOSE VACCINE: ICD-10-PCS | Mod: S$GLB,,, | Performed by: FAMILY MEDICINE

## 2022-02-15 PROCEDURE — 91300 COVID-19, MRNA, LNP-S, PF, 30 MCG/0.3 ML DOSE VACCINE: CPT | Mod: S$GLB,,, | Performed by: FAMILY MEDICINE

## 2022-02-15 PROCEDURE — 91300 COVID-19, MRNA, LNP-S, PF, 30 MCG/0.3 ML DOSE VACCINE: ICD-10-PCS | Mod: S$GLB,,, | Performed by: FAMILY MEDICINE

## 2022-03-29 DIAGNOSIS — F98.8 ATTENTION DEFICIT DISORDER (ADD) IN ADULT: ICD-10-CM

## 2022-03-30 RX ORDER — LISDEXAMFETAMINE DIMESYLATE 70 MG/1
70 CAPSULE ORAL EVERY MORNING
Qty: 30 CAPSULE | Refills: 0 | Status: SHIPPED | OUTPATIENT
Start: 2022-03-30 | End: 2022-05-09 | Stop reason: SDUPTHER

## 2022-04-13 RX ORDER — METHYLPREDNISOLONE 4 MG/1
TABLET ORAL
Qty: 21 EACH | Refills: 0 | Status: SHIPPED | OUTPATIENT
Start: 2022-04-13 | End: 2022-05-24

## 2022-05-05 DIAGNOSIS — F98.8 ATTENTION DEFICIT DISORDER (ADD) IN ADULT: ICD-10-CM

## 2022-05-05 RX ORDER — LISDEXAMFETAMINE DIMESYLATE 70 MG/1
70 CAPSULE ORAL EVERY MORNING
Qty: 30 CAPSULE | Refills: 0 | OUTPATIENT
Start: 2022-05-05

## 2022-05-09 ENCOUNTER — PATIENT MESSAGE (OUTPATIENT)
Dept: FAMILY MEDICINE | Facility: CLINIC | Age: 32
End: 2022-05-09

## 2022-05-09 DIAGNOSIS — F98.8 ATTENTION DEFICIT DISORDER (ADD) IN ADULT: ICD-10-CM

## 2022-05-09 RX ORDER — LISDEXAMFETAMINE DIMESYLATE 70 MG/1
70 CAPSULE ORAL EVERY MORNING
Qty: 14 CAPSULE | Refills: 0 | Status: SHIPPED | OUTPATIENT
Start: 2022-05-09 | End: 2022-05-24 | Stop reason: SDUPTHER

## 2022-05-09 NOTE — TELEPHONE ENCOUNTER
Pt needs to move up his appt- h is overdue for a visit, has to be seen every 3 mo; I will send 14 days

## 2022-05-24 ENCOUNTER — OFFICE VISIT (OUTPATIENT)
Dept: FAMILY MEDICINE | Facility: CLINIC | Age: 32
End: 2022-05-24
Payer: COMMERCIAL

## 2022-05-24 VITALS
DIASTOLIC BLOOD PRESSURE: 80 MMHG | HEART RATE: 97 BPM | WEIGHT: 247.63 LBS | HEIGHT: 72 IN | TEMPERATURE: 98 F | RESPIRATION RATE: 18 BRPM | BODY MASS INDEX: 33.54 KG/M2 | OXYGEN SATURATION: 98 % | SYSTOLIC BLOOD PRESSURE: 110 MMHG

## 2022-05-24 DIAGNOSIS — F41.8 MIXED ANXIETY AND DEPRESSIVE DISORDER: ICD-10-CM

## 2022-05-24 DIAGNOSIS — F98.8 ATTENTION DEFICIT DISORDER (ADD) IN ADULT: ICD-10-CM

## 2022-05-24 DIAGNOSIS — Z00.00 PREVENTATIVE HEALTH CARE: Primary | ICD-10-CM

## 2022-05-24 DIAGNOSIS — E66.09 CLASS 1 OBESITY DUE TO EXCESS CALORIES WITHOUT SERIOUS COMORBIDITY WITH BODY MASS INDEX (BMI) OF 33.0 TO 33.9 IN ADULT: ICD-10-CM

## 2022-05-24 PROCEDURE — 1159F MED LIST DOCD IN RCRD: CPT | Mod: CPTII,S$GLB,, | Performed by: NURSE PRACTITIONER

## 2022-05-24 PROCEDURE — 3008F BODY MASS INDEX DOCD: CPT | Mod: CPTII,S$GLB,, | Performed by: NURSE PRACTITIONER

## 2022-05-24 PROCEDURE — 3008F PR BODY MASS INDEX (BMI) DOCUMENTED: ICD-10-PCS | Mod: CPTII,S$GLB,, | Performed by: NURSE PRACTITIONER

## 2022-05-24 PROCEDURE — 1160F PR REVIEW ALL MEDS BY PRESCRIBER/CLIN PHARMACIST DOCUMENTED: ICD-10-PCS | Mod: CPTII,S$GLB,, | Performed by: NURSE PRACTITIONER

## 2022-05-24 PROCEDURE — 1160F RVW MEDS BY RX/DR IN RCRD: CPT | Mod: CPTII,S$GLB,, | Performed by: NURSE PRACTITIONER

## 2022-05-24 PROCEDURE — 99213 PR OFFICE/OUTPT VISIT, EST, LEVL III, 20-29 MIN: ICD-10-PCS | Mod: S$GLB,,, | Performed by: NURSE PRACTITIONER

## 2022-05-24 PROCEDURE — 1159F PR MEDICATION LIST DOCUMENTED IN MEDICAL RECORD: ICD-10-PCS | Mod: CPTII,S$GLB,, | Performed by: NURSE PRACTITIONER

## 2022-05-24 PROCEDURE — 99213 OFFICE O/P EST LOW 20 MIN: CPT | Mod: S$GLB,,, | Performed by: NURSE PRACTITIONER

## 2022-05-24 RX ORDER — LISDEXAMFETAMINE DIMESYLATE 70 MG/1
70 CAPSULE ORAL EVERY MORNING
Qty: 30 CAPSULE | Refills: 0 | Status: SHIPPED | OUTPATIENT
Start: 2022-05-24 | End: 2022-06-24 | Stop reason: SDUPTHER

## 2022-05-24 NOTE — PROGRESS NOTES
SUBJECTIVE:   HPI: Paco Upton  is a 31 y.o. male who presents for annual physical .   ADHD and Anxiety    Established patient here for follow-up on anxiety/depression, ADHD and medication refills.  He is taking his medications as prescribed daily without side effects or complaints.  He is feeling very good on this medication regimen and wants to continue at his current doses.  He does try to take breaks/drug holidays [facility] on the weekends. Work and home life have been very good. He started a new job and is more active- lost 8 lbs since his last visit. BP is normal today. Due for routine labs.     Anxiety  Presents for follow-up visit. Onset was 1 to 5 years ago. The problem has been gradually worsening. Symptoms include decreased concentration, depressed mood (better) and nervous/anxious behavior. Patient reports no chest pain, confusion, dizziness, excessive worry, feeling of choking, impotence, insomnia, irritability, nausea, obsessions, palpitations, panic (with social activity/events), shortness of breath or suicidal ideas. Symptoms occur occasionally. The severity of symptoms is mild. The symptoms are aggravated by social activities and work stress. The quality of sleep is good. Nighttime awakenings: none.     There are no known risk factors. His past medical history is significant for anxiety/panic attacks. There is no history of suicide attempts. Past treatments include lifestyle changes. The treatment provided no relief. Compliance with medications is %.     (Not in a hospital admission)    Review of patient's allergies indicates:   Allergen Reactions    Sulfa (sulfonamide antibiotics) Anaphylaxis     Current Outpatient Medications on File Prior to Visit   Medication Sig Dispense Refill    buPROPion (WELLBUTRIN XL) 150 MG TB24 tablet TAKE 1 TABLET BY MOUTH EVERY DAY 90 tablet 1    busPIRone (BUSPAR) 10 MG tablet TAKE 1 TABLET BY MOUTH TWICE A DAY 60 tablet 5    cetirizine  (ZYRTEC) 10 mg Cap       montelukast (SINGULAIR) 10 mg tablet Take 1 tablet (10 mg total) by mouth every evening. 90 tablet 1    [DISCONTINUED] lisdexamfetamine (VYVANSE) 70 MG capsule Take 1 capsule (70 mg total) by mouth every morning. 14 capsule 0    [DISCONTINUED] methylPREDNISolone (MEDROL DOSEPACK) 4 mg tablet take as directed on package 21 each 0     No current facility-administered medications on file prior to visit.     Past Medical History:   Diagnosis Date    ADD (attention deficit disorder)     Stomach problems     delayed empting     History reviewed. No pertinent surgical history.  History reviewed. No pertinent family history.  Social History     Tobacco Use    Smoking status: Never Smoker    Smokeless tobacco: Never Used   Substance Use Topics    Alcohol use: Yes    Drug use: No      Health Maintenance Topics with due status: Not Due       Topic Last Completion Date    TETANUS VACCINE 02/09/2021    COVID-19 Vaccine 02/15/2022    Influenza Vaccine Not Due     Immunization History   Administered Date(s) Administered    COVID-19, MRNA, LN-S, PF (Pfizer) (Purple Cap) 01/25/2022, 02/15/2022    DTaP 1990, 03/18/1991, 03/04/1992, 04/08/1994, 11/07/1994    HIB 1990, 03/18/1991, 06/24/1991, 01/01/1992    Hepatitis B, Pediatric/Adolescent 05/04/2004, 05/19/2005, 08/09/2005    IPV 1990, 1990, 03/18/1991, 03/04/1992, 11/07/1994    MMR 11/15/1991, 11/07/1994    Meningococcal Conjugate (MCV4P) 08/14/2009    Td (ADULT) 05/04/2004    Tdap 08/14/2009, 02/09/2021       Review of Systems   Constitutional: Negative for activity change, chills, fatigue, fever, irritability and unexpected weight change.   HENT: Negative for hearing loss, rhinorrhea and trouble swallowing.    Eyes: Negative for discharge and visual disturbance.   Respiratory: Negative for chest tightness, shortness of breath and wheezing.    Cardiovascular: Negative for chest pain, palpitations and leg swelling.    Gastrointestinal: Negative for abdominal pain, blood in stool, constipation, diarrhea, nausea and vomiting.   Endocrine: Negative for polydipsia and polyuria.   Genitourinary: Negative for difficulty urinating, dysuria, hematuria, impotence and urgency.   Musculoskeletal: Negative for arthralgias, back pain, joint swelling and neck pain.   Neurological: Negative for dizziness, weakness, numbness and headaches.   Psychiatric/Behavioral: Positive for decreased concentration. Negative for confusion, dysphoric mood, sleep disturbance and suicidal ideas. The patient is nervous/anxious. The patient does not have insomnia.       OBJECTIVE:      Vitals:    05/24/22 1525   BP: 110/80   BP Location: Right arm   Patient Position: Sitting   BP Method: Large (Manual)   Pulse: 97   Resp: 18   Temp: 98.2 °F (36.8 °C)   TempSrc: Oral   SpO2: 98%   Weight: 112.3 kg (247 lb 9.6 oz)   Height: 6' (1.829 m)     Physical Exam  Vitals and nursing note reviewed.   Constitutional:       General: He is not in acute distress.     Appearance: Normal appearance. He is well-developed. He is obese. He is not ill-appearing or diaphoretic.   HENT:      Head: Normocephalic and atraumatic.      Mouth/Throat:      Mouth: Mucous membranes are moist.      Pharynx: Uvula midline.   Eyes:      General: No scleral icterus.     Extraocular Movements: Extraocular movements intact.      Pupils: Pupils are equal, round, and reactive to light.   Cardiovascular:      Rate and Rhythm: Normal rate and regular rhythm.      Heart sounds: Normal heart sounds. No murmur heard.  Pulmonary:      Effort: Pulmonary effort is normal. No respiratory distress.      Breath sounds: Normal breath sounds. No wheezing, rhonchi or rales.   Musculoskeletal:         General: Normal range of motion.      Cervical back: Normal range of motion and neck supple.      Right lower leg: No edema.      Left lower leg: No edema.   Lymphadenopathy:      Cervical: No cervical adenopathy.    Skin:     General: Skin is warm and dry.      Coloration: Skin is not jaundiced or pale.   Neurological:      General: No focal deficit present.      Mental Status: He is alert and oriented to person, place, and time.   Psychiatric:         Attention and Perception: Attention normal.         Mood and Affect: Mood and affect normal.         Speech: Speech normal.         Behavior: Behavior normal.         Thought Content: Thought content normal.         Judgment: Judgment normal.        Assessment:       1. Preventative health care    2. Attention deficit disorder (ADD) in adult    3. Mixed anxiety and depressive disorder    4. Class 1 obesity due to excess calories without serious comorbidity with body mass index (BMI) of 33.0 to 33.9 in adult        Plan:       Preventative health care  -     CBC Auto Differential; Future; Expected date: 05/24/2022  -     Comprehensive Metabolic Panel; Future; Expected date: 05/24/2022  -     Lipid Panel; Future; Expected date: 05/24/2022  -     TSH; Future; Expected date: 05/24/2022    Attention deficit disorder (ADD) in adult  -     lisdexamfetamine (VYVANSE) 70 MG capsule; Take 1 capsule (70 mg total) by mouth every morning.  Dispense: 30 capsule; Refill: 0  -stable; encouraged pt to take drug holidays/breaks from the medication to prevent tolerance/dependance;  reviewed     Mixed anxiety and depressive disorder  -     CBC Auto Differential; Future; Expected date: 05/24/2022  -     Comprehensive Metabolic Panel; Future; Expected date: 05/24/2022  -     TSH; Future; Expected date: 05/24/2022  -stable    Class 1 obesity due to excess calories without serious comorbidity with body mass index (BMI) of 33.0 to 33.9 in adult            Follow up in about 3 months (around 8/24/2022) for f/u anxiety, ADHD .        This note was created using [employer] voice recognition software that occasionally misinterprets phrases or words.

## 2022-07-27 DIAGNOSIS — F98.8 ATTENTION DEFICIT DISORDER (ADD) IN ADULT: ICD-10-CM

## 2022-07-28 RX ORDER — LISDEXAMFETAMINE DIMESYLATE 70 MG/1
70 CAPSULE ORAL EVERY MORNING
Qty: 30 CAPSULE | Refills: 0 | Status: SHIPPED | OUTPATIENT
Start: 2022-07-28 | End: 2022-09-02 | Stop reason: SDUPTHER

## 2022-08-18 ENCOUNTER — TELEPHONE (OUTPATIENT)
Dept: FAMILY MEDICINE | Facility: CLINIC | Age: 32
End: 2022-08-18

## 2022-08-18 NOTE — TELEPHONE ENCOUNTER
Spoke with patient, reminding him to have his labs done before his upcoming appt on next. Patient verbalized understanding.

## 2022-08-28 DIAGNOSIS — F98.8 ATTENTION DEFICIT DISORDER (ADD) IN ADULT: ICD-10-CM

## 2022-08-28 RX ORDER — LISDEXAMFETAMINE DIMESYLATE 70 MG/1
70 CAPSULE ORAL EVERY MORNING
Qty: 30 CAPSULE | Refills: 0 | Status: CANCELLED | OUTPATIENT
Start: 2022-08-28

## 2022-08-31 ENCOUNTER — TELEPHONE (OUTPATIENT)
Dept: FAMILY MEDICINE | Facility: CLINIC | Age: 32
End: 2022-08-31

## 2022-09-02 ENCOUNTER — LAB VISIT (OUTPATIENT)
Dept: LAB | Facility: HOSPITAL | Age: 32
End: 2022-09-02
Attending: NURSE PRACTITIONER
Payer: COMMERCIAL

## 2022-09-02 ENCOUNTER — OFFICE VISIT (OUTPATIENT)
Dept: FAMILY MEDICINE | Facility: CLINIC | Age: 32
End: 2022-09-02
Payer: COMMERCIAL

## 2022-09-02 VITALS
HEIGHT: 72 IN | TEMPERATURE: 99 F | DIASTOLIC BLOOD PRESSURE: 82 MMHG | SYSTOLIC BLOOD PRESSURE: 122 MMHG | OXYGEN SATURATION: 98 % | HEART RATE: 69 BPM | BODY MASS INDEX: 32.64 KG/M2 | RESPIRATION RATE: 18 BRPM | WEIGHT: 241 LBS

## 2022-09-02 DIAGNOSIS — F41.8 MIXED ANXIETY AND DEPRESSIVE DISORDER: ICD-10-CM

## 2022-09-02 DIAGNOSIS — Z00.00 PREVENTATIVE HEALTH CARE: ICD-10-CM

## 2022-09-02 DIAGNOSIS — F98.8 ATTENTION DEFICIT DISORDER (ADD) IN ADULT: Primary | ICD-10-CM

## 2022-09-02 LAB
ALBUMIN SERPL BCP-MCNC: 4.7 G/DL (ref 3.5–5.2)
ALP SERPL-CCNC: 75 U/L (ref 55–135)
ALT SERPL W/O P-5'-P-CCNC: 59 U/L (ref 10–44)
ANION GAP SERPL CALC-SCNC: 7 MMOL/L (ref 8–16)
AST SERPL-CCNC: 33 U/L (ref 10–40)
BASOPHILS # BLD AUTO: 0.04 K/UL (ref 0–0.2)
BASOPHILS NFR BLD: 0.7 % (ref 0–1.9)
BILIRUB SERPL-MCNC: 0.8 MG/DL (ref 0.1–1)
BUN SERPL-MCNC: 21 MG/DL (ref 6–20)
CALCIUM SERPL-MCNC: 9.1 MG/DL (ref 8.7–10.5)
CHLORIDE SERPL-SCNC: 104 MMOL/L (ref 95–110)
CHOLEST SERPL-MCNC: 164 MG/DL (ref 120–199)
CHOLEST/HDLC SERPL: 4.2 {RATIO} (ref 2–5)
CO2 SERPL-SCNC: 27 MMOL/L (ref 23–29)
CREAT SERPL-MCNC: 1 MG/DL (ref 0.5–1.4)
DIFFERENTIAL METHOD: NORMAL
EOSINOPHIL # BLD AUTO: 0.1 K/UL (ref 0–0.5)
EOSINOPHIL NFR BLD: 1.8 % (ref 0–8)
ERYTHROCYTE [DISTWIDTH] IN BLOOD BY AUTOMATED COUNT: 12.9 % (ref 11.5–14.5)
EST. GFR  (NO RACE VARIABLE): >60 ML/MIN/1.73 M^2
GLUCOSE SERPL-MCNC: 106 MG/DL (ref 70–110)
HCT VFR BLD AUTO: 43.5 % (ref 40–54)
HDLC SERPL-MCNC: 39 MG/DL (ref 40–75)
HDLC SERPL: 23.8 % (ref 20–50)
HGB BLD-MCNC: 14.5 G/DL (ref 14–18)
IMM GRANULOCYTES # BLD AUTO: 0.02 K/UL (ref 0–0.04)
IMM GRANULOCYTES NFR BLD AUTO: 0.3 % (ref 0–0.5)
LDLC SERPL CALC-MCNC: 113.4 MG/DL (ref 63–159)
LYMPHOCYTES # BLD AUTO: 2.2 K/UL (ref 1–4.8)
LYMPHOCYTES NFR BLD: 36.9 % (ref 18–48)
MCH RBC QN AUTO: 28.6 PG (ref 27–31)
MCHC RBC AUTO-ENTMCNC: 33.3 G/DL (ref 32–36)
MCV RBC AUTO: 86 FL (ref 82–98)
MONOCYTES # BLD AUTO: 0.5 K/UL (ref 0.3–1)
MONOCYTES NFR BLD: 7.5 % (ref 4–15)
NEUTROPHILS # BLD AUTO: 3.2 K/UL (ref 1.8–7.7)
NEUTROPHILS NFR BLD: 52.8 % (ref 38–73)
NONHDLC SERPL-MCNC: 125 MG/DL
NRBC BLD-RTO: 0 /100 WBC
PLATELET # BLD AUTO: 222 K/UL (ref 150–450)
PMV BLD AUTO: 9.6 FL (ref 9.2–12.9)
POTASSIUM SERPL-SCNC: 4.4 MMOL/L (ref 3.5–5.1)
PROT SERPL-MCNC: 7.9 G/DL (ref 6–8.4)
RBC # BLD AUTO: 5.07 M/UL (ref 4.6–6.2)
SODIUM SERPL-SCNC: 138 MMOL/L (ref 136–145)
TRIGL SERPL-MCNC: 58 MG/DL (ref 30–150)
TSH SERPL DL<=0.005 MIU/L-ACNC: 1.64 UIU/ML (ref 0.34–5.6)
WBC # BLD AUTO: 6.02 K/UL (ref 3.9–12.7)

## 2022-09-02 PROCEDURE — 3074F SYST BP LT 130 MM HG: CPT | Mod: CPTII,S$GLB,, | Performed by: NURSE PRACTITIONER

## 2022-09-02 PROCEDURE — 1160F PR REVIEW ALL MEDS BY PRESCRIBER/CLIN PHARMACIST DOCUMENTED: ICD-10-PCS | Mod: CPTII,S$GLB,, | Performed by: NURSE PRACTITIONER

## 2022-09-02 PROCEDURE — 3079F PR MOST RECENT DIASTOLIC BLOOD PRESSURE 80-89 MM HG: ICD-10-PCS | Mod: CPTII,S$GLB,, | Performed by: NURSE PRACTITIONER

## 2022-09-02 PROCEDURE — 36415 COLL VENOUS BLD VENIPUNCTURE: CPT | Performed by: NURSE PRACTITIONER

## 2022-09-02 PROCEDURE — 3074F PR MOST RECENT SYSTOLIC BLOOD PRESSURE < 130 MM HG: ICD-10-PCS | Mod: CPTII,S$GLB,, | Performed by: NURSE PRACTITIONER

## 2022-09-02 PROCEDURE — 80053 COMPREHEN METABOLIC PANEL: CPT | Performed by: NURSE PRACTITIONER

## 2022-09-02 PROCEDURE — 1160F RVW MEDS BY RX/DR IN RCRD: CPT | Mod: CPTII,S$GLB,, | Performed by: NURSE PRACTITIONER

## 2022-09-02 PROCEDURE — 85025 COMPLETE CBC W/AUTO DIFF WBC: CPT | Performed by: NURSE PRACTITIONER

## 2022-09-02 PROCEDURE — 3079F DIAST BP 80-89 MM HG: CPT | Mod: CPTII,S$GLB,, | Performed by: NURSE PRACTITIONER

## 2022-09-02 PROCEDURE — 99213 OFFICE O/P EST LOW 20 MIN: CPT | Mod: S$GLB,,, | Performed by: NURSE PRACTITIONER

## 2022-09-02 PROCEDURE — 3008F BODY MASS INDEX DOCD: CPT | Mod: CPTII,S$GLB,, | Performed by: NURSE PRACTITIONER

## 2022-09-02 PROCEDURE — 1159F MED LIST DOCD IN RCRD: CPT | Mod: CPTII,S$GLB,, | Performed by: NURSE PRACTITIONER

## 2022-09-02 PROCEDURE — 1159F PR MEDICATION LIST DOCUMENTED IN MEDICAL RECORD: ICD-10-PCS | Mod: CPTII,S$GLB,, | Performed by: NURSE PRACTITIONER

## 2022-09-02 PROCEDURE — 99213 PR OFFICE/OUTPT VISIT, EST, LEVL III, 20-29 MIN: ICD-10-PCS | Mod: S$GLB,,, | Performed by: NURSE PRACTITIONER

## 2022-09-02 PROCEDURE — 80061 LIPID PANEL: CPT | Performed by: NURSE PRACTITIONER

## 2022-09-02 PROCEDURE — 84443 ASSAY THYROID STIM HORMONE: CPT | Performed by: NURSE PRACTITIONER

## 2022-09-02 PROCEDURE — 3008F PR BODY MASS INDEX (BMI) DOCUMENTED: ICD-10-PCS | Mod: CPTII,S$GLB,, | Performed by: NURSE PRACTITIONER

## 2022-09-02 RX ORDER — LISDEXAMFETAMINE DIMESYLATE 70 MG/1
70 CAPSULE ORAL EVERY MORNING
Qty: 30 CAPSULE | Refills: 0 | Status: SHIPPED | OUTPATIENT
Start: 2022-09-02 | End: 2022-09-30 | Stop reason: SDUPTHER

## 2022-09-02 RX ORDER — LISDEXAMFETAMINE DIMESYLATE 70 MG/1
70 CAPSULE ORAL EVERY MORNING
Qty: 30 CAPSULE | Refills: 0 | Status: CANCELLED | OUTPATIENT
Start: 2022-09-02

## 2022-09-02 NOTE — PROGRESS NOTES
SUBJECTIVE:      Patient ID: Paco Upton is a 32 y.o. male.    Chief Complaint: Anxiety and ADHD    Established patient here for follow-up on ADHD and medication refills.  He is taking his medications as prescribed daily without side effects or complaints.  He is feeling very good on this medication regimen and wants to continue at his current doses.  He does try to take breaks/[facility] holidays [facility] on the weekends.    Anxiety  Presents for follow-up visit. Symptoms include decreased concentration, depressed mood (better) and nervous/anxious behavior. Patient reports no chest pain, confusion, dizziness, excessive worry, feeling of choking, impotence, insomnia, irritability, nausea, obsessions, palpitations, panic (with social activity/events), shortness of breath or suicidal ideas. Symptoms occur occasionally. The severity of symptoms is mild. The quality of sleep is good. Nighttime awakenings: none.     Compliance with medications is %.     History reviewed. No pertinent family history.   Social History     Socioeconomic History    Marital status:    Tobacco Use    Smoking status: Never    Smokeless tobacco: Never   Substance and Sexual Activity    Alcohol use: Yes    Drug use: No    Sexual activity: Yes     Partners: Female     Birth control/protection: OCP     Social Determinants of Health     Financial Resource Strain: Low Risk     Difficulty of Paying Living Expenses: Not very hard   Food Insecurity: No Food Insecurity    Worried About Running Out of Food in the Last Year: Never true    Ran Out of Food in the Last Year: Never true   Transportation Needs: No Transportation Needs    Lack of Transportation (Medical): No    Lack of Transportation (Non-Medical): No   Physical Activity: Sufficiently Active    Days of Exercise per Week: 5 days    Minutes of Exercise per Session: 150+ min   Stress: Stress Concern Present    Feeling of Stress : To some extent   Social Connections: Unknown     Frequency of Communication with Friends and Family: More than three times a week    Frequency of Social Gatherings with Friends and Family: Once a week    Active Member of Clubs or Organizations: No    Attends Club or Organization Meetings: Never    Marital Status:    Housing Stability: Low Risk     Unable to Pay for Housing in the Last Year: No    Number of Places Lived in the Last Year: 1    Unstable Housing in the Last Year: No     Current Outpatient Medications   Medication Sig Dispense Refill    buPROPion (WELLBUTRIN XL) 150 MG TB24 tablet TAKE 1 TABLET BY MOUTH EVERY DAY 90 tablet 1    busPIRone (BUSPAR) 10 MG tablet TAKE 1 TABLET BY MOUTH TWICE A DAY 60 tablet 5    cetirizine (ZYRTEC) 10 mg Cap       lisdexamfetamine (VYVANSE) 70 MG capsule Take 1 capsule (70 mg total) by mouth every morning. 30 capsule 0     No current facility-administered medications for this visit.     Review of patient's allergies indicates:   Allergen Reactions    Sulfa (sulfonamide antibiotics) Anaphylaxis      Past Medical History:   Diagnosis Date    ADD (attention deficit disorder)     Stomach problems     delayed empting     History reviewed. No pertinent surgical history.    Review of Systems   Constitutional:  Negative for activity change, chills, fatigue, fever, irritability and unexpected weight change.   HENT:  Negative for rhinorrhea and trouble swallowing.    Eyes:  Negative for discharge and visual disturbance.   Respiratory:  Negative for cough, chest tightness, shortness of breath and wheezing.    Cardiovascular:  Negative for chest pain, palpitations and leg swelling.   Gastrointestinal:  Negative for abdominal pain, diarrhea, nausea and vomiting.   Endocrine: Negative for polydipsia and polyuria.   Genitourinary:  Negative for difficulty urinating, dysuria, hematuria, impotence and urgency.   Musculoskeletal:  Negative for arthralgias, back pain, joint swelling and neck pain.   Neurological:  Negative for  dizziness, weakness, numbness and headaches.   Psychiatric/Behavioral:  Positive for decreased concentration. Negative for confusion, dysphoric mood, sleep disturbance and suicidal ideas. The patient is nervous/anxious. The patient does not have insomnia.     OBJECTIVE:      Vitals:    09/02/22 0820   BP: 122/82   BP Location: Left arm   Patient Position: Sitting   BP Method: Large (Manual)   Pulse: 69   Resp: 18   Temp: 98.6 °F (37 °C)   TempSrc: Oral   SpO2: 98%   Weight: 109.3 kg (241 lb)   Height: 6' (1.829 m)     Physical Exam  Vitals and nursing note reviewed.   Constitutional:       General: He is not in acute distress.     Appearance: Normal appearance. He is well-developed. He is obese. He is not ill-appearing or diaphoretic.   HENT:      Head: Normocephalic.      Mouth/Throat:      Mouth: Mucous membranes are moist.      Pharynx: Uvula midline.   Eyes:      General: No scleral icterus.     Pupils: Pupils are equal, round, and reactive to light.   Cardiovascular:      Rate and Rhythm: Normal rate and regular rhythm.      Heart sounds: Normal heart sounds. No murmur heard.  Pulmonary:      Effort: Pulmonary effort is normal. No respiratory distress.      Breath sounds: Normal breath sounds. No wheezing, rhonchi or rales.   Musculoskeletal:         General: Normal range of motion.      Cervical back: Normal range of motion and neck supple.      Right lower leg: No edema.      Left lower leg: No edema.   Lymphadenopathy:      Cervical: No cervical adenopathy.   Skin:     General: Skin is warm and dry.      Coloration: Skin is not jaundiced or pale.   Neurological:      Mental Status: He is alert and oriented to person, place, and time.   Psychiatric:         Attention and Perception: Attention normal.         Mood and Affect: Mood and affect normal.         Speech: Speech normal.         Behavior: Behavior normal.         Thought Content: Thought content normal.         Judgment: Judgment normal.       Assessment:       1. Attention deficit disorder (ADD) in adult    2. Mixed anxiety and depressive disorder          Plan:       Attention deficit disorder (ADD) in adult  -     lisdexamfetamine (VYVANSE) 70 MG capsule; Take 1 capsule (70 mg total) by mouth every morning.  Dispense: 30 capsule; Refill: 0  -stable, cont meds prn; encouraged pt to take drug holidays on weekends/days off work     Mixed anxiety and depressive disorder   -stable      Follow up in about 3 months (around 12/2/2022) for annual .      9/2/2022 JESSICA Parr, FNP    This note was created using [employer] voice recognition software that occasionally misinterprets phrases or words.

## 2022-09-08 ENCOUNTER — TELEPHONE (OUTPATIENT)
Dept: FAMILY MEDICINE | Facility: CLINIC | Age: 32
End: 2022-09-08

## 2022-09-08 NOTE — TELEPHONE ENCOUNTER
"Attempted to contact pt to notify of results "Cholesterol is slightly elevated but not much change since last year; 1 liver enzyme is slightly elevated again-cut down on fatty foods, alcohol, and any Tylenol products; add a fiber supplement such as Metamucil or Benefiber once daily and recommend exercise and weight loss; otherwise labs are normal and follow-up as planned". No answer. LM  "

## 2022-09-30 DIAGNOSIS — F98.8 ATTENTION DEFICIT DISORDER (ADD) IN ADULT: ICD-10-CM

## 2022-10-03 RX ORDER — LISDEXAMFETAMINE DIMESYLATE 70 MG/1
70 CAPSULE ORAL EVERY MORNING
Qty: 30 CAPSULE | Refills: 0 | Status: SHIPPED | OUTPATIENT
Start: 2022-10-03 | End: 2022-11-05 | Stop reason: SDUPTHER

## 2022-11-05 DIAGNOSIS — F98.8 ATTENTION DEFICIT DISORDER (ADD) IN ADULT: ICD-10-CM

## 2022-11-08 RX ORDER — LISDEXAMFETAMINE DIMESYLATE 70 MG/1
70 CAPSULE ORAL EVERY MORNING
Qty: 30 CAPSULE | Refills: 0 | Status: SHIPPED | OUTPATIENT
Start: 2022-11-08 | End: 2022-12-15 | Stop reason: SDUPTHER

## 2022-12-01 ENCOUNTER — PATIENT MESSAGE (OUTPATIENT)
Dept: FAMILY MEDICINE | Facility: CLINIC | Age: 32
End: 2022-12-01

## 2022-12-12 DIAGNOSIS — F98.8 ATTENTION DEFICIT DISORDER (ADD) IN ADULT: ICD-10-CM

## 2022-12-12 RX ORDER — LISDEXAMFETAMINE DIMESYLATE 70 MG/1
70 CAPSULE ORAL EVERY MORNING
Qty: 30 CAPSULE | Refills: 0 | Status: CANCELLED | OUTPATIENT
Start: 2022-12-12

## 2022-12-15 ENCOUNTER — OFFICE VISIT (OUTPATIENT)
Dept: FAMILY MEDICINE | Facility: CLINIC | Age: 32
End: 2022-12-15
Payer: COMMERCIAL

## 2022-12-15 VITALS
BODY MASS INDEX: 32.85 KG/M2 | TEMPERATURE: 99 F | HEIGHT: 72 IN | WEIGHT: 242.5 LBS | RESPIRATION RATE: 20 BRPM | SYSTOLIC BLOOD PRESSURE: 110 MMHG | DIASTOLIC BLOOD PRESSURE: 82 MMHG | OXYGEN SATURATION: 99 % | HEART RATE: 86 BPM

## 2022-12-15 DIAGNOSIS — E66.09 CLASS 1 OBESITY DUE TO EXCESS CALORIES WITHOUT SERIOUS COMORBIDITY WITH BODY MASS INDEX (BMI) OF 33.0 TO 33.9 IN ADULT: ICD-10-CM

## 2022-12-15 DIAGNOSIS — R79.89 ELEVATED LIVER FUNCTION TESTS: ICD-10-CM

## 2022-12-15 DIAGNOSIS — Z00.00 ENCOUNTER FOR ROUTINE ADULT HEALTH EXAMINATION WITHOUT ABNORMAL FINDINGS: Primary | ICD-10-CM

## 2022-12-15 DIAGNOSIS — F98.8 ATTENTION DEFICIT DISORDER (ADD) IN ADULT: ICD-10-CM

## 2022-12-15 DIAGNOSIS — R14.0 ABDOMINAL BLOATING: ICD-10-CM

## 2022-12-15 PROCEDURE — 3079F DIAST BP 80-89 MM HG: CPT | Mod: CPTII,S$GLB,, | Performed by: NURSE PRACTITIONER

## 2022-12-15 PROCEDURE — 1160F RVW MEDS BY RX/DR IN RCRD: CPT | Mod: CPTII,S$GLB,, | Performed by: NURSE PRACTITIONER

## 2022-12-15 PROCEDURE — 1159F PR MEDICATION LIST DOCUMENTED IN MEDICAL RECORD: ICD-10-PCS | Mod: CPTII,S$GLB,, | Performed by: NURSE PRACTITIONER

## 2022-12-15 PROCEDURE — 3008F PR BODY MASS INDEX (BMI) DOCUMENTED: ICD-10-PCS | Mod: CPTII,S$GLB,, | Performed by: NURSE PRACTITIONER

## 2022-12-15 PROCEDURE — 3008F BODY MASS INDEX DOCD: CPT | Mod: CPTII,S$GLB,, | Performed by: NURSE PRACTITIONER

## 2022-12-15 PROCEDURE — 1160F PR REVIEW ALL MEDS BY PRESCRIBER/CLIN PHARMACIST DOCUMENTED: ICD-10-PCS | Mod: CPTII,S$GLB,, | Performed by: NURSE PRACTITIONER

## 2022-12-15 PROCEDURE — 3074F SYST BP LT 130 MM HG: CPT | Mod: CPTII,S$GLB,, | Performed by: NURSE PRACTITIONER

## 2022-12-15 PROCEDURE — 99395 PREV VISIT EST AGE 18-39: CPT | Mod: S$GLB,,, | Performed by: NURSE PRACTITIONER

## 2022-12-15 PROCEDURE — 1159F MED LIST DOCD IN RCRD: CPT | Mod: CPTII,S$GLB,, | Performed by: NURSE PRACTITIONER

## 2022-12-15 PROCEDURE — 3074F PR MOST RECENT SYSTOLIC BLOOD PRESSURE < 130 MM HG: ICD-10-PCS | Mod: CPTII,S$GLB,, | Performed by: NURSE PRACTITIONER

## 2022-12-15 PROCEDURE — 3079F PR MOST RECENT DIASTOLIC BLOOD PRESSURE 80-89 MM HG: ICD-10-PCS | Mod: CPTII,S$GLB,, | Performed by: NURSE PRACTITIONER

## 2022-12-15 PROCEDURE — 99395 PR PREVENTIVE VISIT,EST,18-39: ICD-10-PCS | Mod: S$GLB,,, | Performed by: NURSE PRACTITIONER

## 2022-12-15 RX ORDER — LISDEXAMFETAMINE DIMESYLATE 70 MG/1
70 CAPSULE ORAL EVERY MORNING
Qty: 30 CAPSULE | Refills: 0 | Status: SHIPPED | OUTPATIENT
Start: 2022-12-15 | End: 2023-01-17 | Stop reason: SDUPTHER

## 2022-12-15 NOTE — PROGRESS NOTES
SUBJECTIVE:      Patient ID: Paco Upton is a 32 y.o. male.    Chief Complaint: Annual Exam    Established patient here for annual exam and physical.  He is taking his medications for anxiety, depression, and ADHD has prescribed without side effects complaints.  Patient does try to take breaks from his Vyvanse when he is able to on the weekends and during holiday breaks.  He denies any chest pain, shortness of breath, palpitations, edema, or headaches.  Recent labs from 9/22 reviewed.  Vaccinations reviewed with patient. Having some bloating in his abdomen and fullness after meals. Denies alcohol intake. Does not eat the best diet.       History reviewed. No pertinent family history.   Social History     Socioeconomic History    Marital status:    Tobacco Use    Smoking status: Never    Smokeless tobacco: Never   Substance and Sexual Activity    Alcohol use: Yes    Drug use: No    Sexual activity: Yes     Partners: Female     Birth control/protection: OCP     Social Determinants of Health     Financial Resource Strain: Low Risk     Difficulty of Paying Living Expenses: Not very hard   Food Insecurity: No Food Insecurity    Worried About Running Out of Food in the Last Year: Never true    Ran Out of Food in the Last Year: Never true   Transportation Needs: No Transportation Needs    Lack of Transportation (Medical): No    Lack of Transportation (Non-Medical): No   Physical Activity: Sufficiently Active    Days of Exercise per Week: 7 days    Minutes of Exercise per Session: 60 min   Stress: Stress Concern Present    Feeling of Stress : Rather much   Social Connections: Unknown    Frequency of Communication with Friends and Family: More than three times a week    Frequency of Social Gatherings with Friends and Family: Once a week    Active Member of Clubs or Organizations: No    Attends Club or Organization Meetings: Never    Marital Status:    Housing Stability: Low Risk     Unable to Pay for  Housing in the Last Year: No    Number of Places Lived in the Last Year: 1    Unstable Housing in the Last Year: No     Current Outpatient Medications   Medication Sig Dispense Refill    buPROPion (WELLBUTRIN XL) 150 MG TB24 tablet TAKE 1 TABLET BY MOUTH EVERY DAY 90 tablet 1    busPIRone (BUSPAR) 10 MG tablet TAKE 1 TABLET BY MOUTH TWICE A  tablet 1    cetirizine (ZYRTEC) 10 mg Cap       lisdexamfetamine (VYVANSE) 70 MG capsule Take 1 capsule (70 mg total) by mouth every morning. 30 capsule 0     No current facility-administered medications for this visit.     Review of patient's allergies indicates:   Allergen Reactions    Sulfa (sulfonamide antibiotics) Anaphylaxis      Past Medical History:   Diagnosis Date    ADD (attention deficit disorder)     Stomach problems     delayed empting     History reviewed. No pertinent surgical history.    Review of Systems   Constitutional:  Negative for activity change, appetite change, chills, fatigue, fever and unexpected weight change.   HENT:  Negative for congestion, ear discharge, ear pain, hearing loss, rhinorrhea, sore throat and trouble swallowing.    Eyes:  Negative for pain, discharge and visual disturbance.   Respiratory:  Negative for cough, chest tightness, shortness of breath and wheezing.    Cardiovascular:  Negative for chest pain, palpitations and leg swelling.   Gastrointestinal:  Positive for abdominal distention. Negative for abdominal pain, blood in stool, constipation, diarrhea, nausea and vomiting.   Endocrine: Negative for cold intolerance, heat intolerance, polydipsia and polyuria.   Genitourinary:  Negative for difficulty urinating, dysuria, frequency, hematuria and urgency.   Musculoskeletal:  Negative for arthralgias, back pain, joint swelling and neck pain.   Skin:  Negative for pallor and rash.   Neurological:  Negative for dizziness, weakness, numbness and headaches.   Hematological:  Negative for adenopathy.   Psychiatric/Behavioral:   Positive for decreased concentration. Negative for confusion, dysphoric mood, sleep disturbance and suicidal ideas. The patient is not nervous/anxious.     OBJECTIVE:      Vitals:    12/15/22 1521   BP: 110/82   BP Location: Left arm   Patient Position: Sitting   BP Method: Large (Manual)   Pulse: 86   Resp: 20   Temp: 98.6 °F (37 °C)   TempSrc: Oral   SpO2: 99%   Weight: 110 kg (242 lb 8 oz)   Height: 6' (1.829 m)     Physical Exam  Vitals and nursing note reviewed.   Constitutional:       General: He is not in acute distress.     Appearance: Normal appearance. He is well-developed. He is obese. He is not ill-appearing or diaphoretic.   HENT:      Head: Normocephalic and atraumatic.      Right Ear: Tympanic membrane, ear canal and external ear normal.      Left Ear: Tympanic membrane, ear canal and external ear normal.      Nose: Nose normal.      Mouth/Throat:      Mouth: Mucous membranes are moist.      Pharynx: Oropharynx is clear. Uvula midline. No oropharyngeal exudate or posterior oropharyngeal erythema.   Eyes:      General: No scleral icterus.     Extraocular Movements: Extraocular movements intact.      Conjunctiva/sclera: Conjunctivae normal.      Pupils: Pupils are equal, round, and reactive to light.   Neck:      Thyroid: No thyromegaly.   Cardiovascular:      Rate and Rhythm: Normal rate and regular rhythm.      Pulses: Normal pulses.      Heart sounds: Normal heart sounds. No murmur heard.  Pulmonary:      Effort: Pulmonary effort is normal.      Breath sounds: Normal breath sounds. No wheezing or rales.   Abdominal:      General: Bowel sounds are normal. There is no distension.      Palpations: Abdomen is soft.      Tenderness: There is no abdominal tenderness.   Musculoskeletal:         General: Normal range of motion.      Cervical back: Normal range of motion and neck supple.      Right lower leg: No edema.      Left lower leg: No edema.   Lymphadenopathy:      Cervical: No cervical adenopathy.    Skin:     General: Skin is warm and dry.      Coloration: Skin is not jaundiced or pale.   Neurological:      Mental Status: He is alert and oriented to person, place, and time. Mental status is at baseline.      Gait: Gait normal.   Psychiatric:         Attention and Perception: Attention normal.         Mood and Affect: Mood and affect normal.         Speech: Speech normal.         Behavior: Behavior normal.         Thought Content: Thought content normal.         Judgment: Judgment normal.      Assessment:       1. Encounter for routine adult health examination without abnormal findings    2. Attention deficit disorder (ADD) in adult    3. Class 1 obesity due to excess calories without serious comorbidity with body mass index (BMI) of 33.0 to 33.9 in adult    4. Abdominal bloating    5. Elevated liver function tests          Plan:       Encounter for routine adult health examination without abnormal findings    Attention deficit disorder (ADD) in adult  -     lisdexamfetamine (VYVANSE) 70 MG capsule; Take 1 capsule (70 mg total) by mouth every morning.  Dispense: 30 capsule; Refill: 0    Class 1 obesity due to excess calories without serious comorbidity with body mass index (BMI) of 33.0 to 33.9 in adult    Abdominal bloating  -     US Abdomen Limited; Future   -r/o gallstones     Elevated liver function tests  -     US Abdomen Limited; Future   -r/o fatty liver      Follow up in about 1 year (around 12/15/2023) for annual .      12/15/2022 JESSICA Parr, FARZANAP    This note was created using [employer] voice recognition software that occasionally misinterprets phrases or words.

## 2022-12-21 ENCOUNTER — TELEPHONE (OUTPATIENT)
Dept: FAMILY MEDICINE | Facility: CLINIC | Age: 32
End: 2022-12-21

## 2022-12-21 NOTE — TELEPHONE ENCOUNTER
Called patient. No answer. Message left for return call to scheduling dept to schedule ultrasound [phone]. Portal message sent as well.

## 2022-12-21 NOTE — TELEPHONE ENCOUNTER
----- Message from MARK Albert sent at 12/21/2022 10:32 AM CST -----  Regarding: FW: Unable to Contact  Please call pt- they have not been able to reach him to schedule US; send portal message if you cannot get a hold of him please   ----- Message -----  From: Yolanda Sharp  Sent: 12/21/2022  10:14 AM CST  To: MARK Albert  Subject: Unable to Contact                                We have made multiple attempts to contact this pt to  schedule their US Abdomen and have been unable to reach them therefore we are removing this from our work queue. We just wanted to make you aware of this in case you wanted to reach out to the patient.     Thanks,   [facility] Centralized Scheduling         pt complains of rash at the head of his penis for 2 weeks, hurts more when squeezed. Occasional blood noted. fever 1 week ago however improved.

## 2023-01-17 DIAGNOSIS — F98.8 ATTENTION DEFICIT DISORDER (ADD) IN ADULT: ICD-10-CM

## 2023-01-17 RX ORDER — LISDEXAMFETAMINE DIMESYLATE 70 MG/1
70 CAPSULE ORAL EVERY MORNING
Qty: 30 CAPSULE | Refills: 0 | Status: SHIPPED | OUTPATIENT
Start: 2023-01-17 | End: 2023-02-16 | Stop reason: SDUPTHER

## 2023-02-16 DIAGNOSIS — F98.8 ATTENTION DEFICIT DISORDER (ADD) IN ADULT: ICD-10-CM

## 2023-02-17 RX ORDER — LISDEXAMFETAMINE DIMESYLATE 70 MG/1
70 CAPSULE ORAL EVERY MORNING
Qty: 30 CAPSULE | Refills: 0 | Status: SHIPPED | OUTPATIENT
Start: 2023-02-17 | End: 2023-03-28 | Stop reason: SDUPTHER

## 2023-03-10 ENCOUNTER — TELEPHONE (OUTPATIENT)
Dept: FAMILY MEDICINE | Facility: CLINIC | Age: 33
End: 2023-03-10

## 2023-03-10 NOTE — TELEPHONE ENCOUNTER
Called patient to reschedule appt 3/15/23 3:20pm, as that appt time is no longer available. No answer. Message left for return call to [phone] option #3. Portal message sent.

## 2023-03-28 ENCOUNTER — OFFICE VISIT (OUTPATIENT)
Dept: FAMILY MEDICINE | Facility: CLINIC | Age: 33
End: 2023-03-28
Payer: COMMERCIAL

## 2023-03-28 VITALS
HEART RATE: 93 BPM | DIASTOLIC BLOOD PRESSURE: 74 MMHG | OXYGEN SATURATION: 99 % | HEIGHT: 72 IN | TEMPERATURE: 98 F | BODY MASS INDEX: 33.56 KG/M2 | RESPIRATION RATE: 20 BRPM | SYSTOLIC BLOOD PRESSURE: 130 MMHG | WEIGHT: 247.81 LBS

## 2023-03-28 DIAGNOSIS — F41.8 MIXED ANXIETY AND DEPRESSIVE DISORDER: ICD-10-CM

## 2023-03-28 DIAGNOSIS — F98.8 ATTENTION DEFICIT DISORDER (ADD) IN ADULT: ICD-10-CM

## 2023-03-28 PROCEDURE — 1160F RVW MEDS BY RX/DR IN RCRD: CPT | Mod: CPTII,S$GLB,, | Performed by: NURSE PRACTITIONER

## 2023-03-28 PROCEDURE — 99214 OFFICE O/P EST MOD 30 MIN: CPT | Mod: S$GLB,,, | Performed by: NURSE PRACTITIONER

## 2023-03-28 PROCEDURE — 3075F PR MOST RECENT SYSTOLIC BLOOD PRESS GE 130-139MM HG: ICD-10-PCS | Mod: CPTII,S$GLB,, | Performed by: NURSE PRACTITIONER

## 2023-03-28 PROCEDURE — 99214 PR OFFICE/OUTPT VISIT, EST, LEVL IV, 30-39 MIN: ICD-10-PCS | Mod: S$GLB,,, | Performed by: NURSE PRACTITIONER

## 2023-03-28 PROCEDURE — 1159F PR MEDICATION LIST DOCUMENTED IN MEDICAL RECORD: ICD-10-PCS | Mod: CPTII,S$GLB,, | Performed by: NURSE PRACTITIONER

## 2023-03-28 PROCEDURE — 1159F MED LIST DOCD IN RCRD: CPT | Mod: CPTII,S$GLB,, | Performed by: NURSE PRACTITIONER

## 2023-03-28 PROCEDURE — 1160F PR REVIEW ALL MEDS BY PRESCRIBER/CLIN PHARMACIST DOCUMENTED: ICD-10-PCS | Mod: CPTII,S$GLB,, | Performed by: NURSE PRACTITIONER

## 2023-03-28 PROCEDURE — 3008F PR BODY MASS INDEX (BMI) DOCUMENTED: ICD-10-PCS | Mod: CPTII,S$GLB,, | Performed by: NURSE PRACTITIONER

## 2023-03-28 PROCEDURE — 3075F SYST BP GE 130 - 139MM HG: CPT | Mod: CPTII,S$GLB,, | Performed by: NURSE PRACTITIONER

## 2023-03-28 PROCEDURE — 3008F BODY MASS INDEX DOCD: CPT | Mod: CPTII,S$GLB,, | Performed by: NURSE PRACTITIONER

## 2023-03-28 PROCEDURE — 3078F PR MOST RECENT DIASTOLIC BLOOD PRESSURE < 80 MM HG: ICD-10-PCS | Mod: CPTII,S$GLB,, | Performed by: NURSE PRACTITIONER

## 2023-03-28 PROCEDURE — 3078F DIAST BP <80 MM HG: CPT | Mod: CPTII,S$GLB,, | Performed by: NURSE PRACTITIONER

## 2023-03-28 RX ORDER — BUPROPION HYDROCHLORIDE 150 MG/1
150 TABLET ORAL DAILY
Qty: 90 TABLET | Refills: 1 | Status: SHIPPED | OUTPATIENT
Start: 2023-03-28 | End: 2023-11-01 | Stop reason: SDUPTHER

## 2023-03-28 RX ORDER — LISDEXAMFETAMINE DIMESYLATE 70 MG/1
70 CAPSULE ORAL EVERY MORNING
Qty: 30 CAPSULE | Refills: 0 | Status: SHIPPED | OUTPATIENT
Start: 2023-03-28 | End: 2023-05-02 | Stop reason: SDUPTHER

## 2023-03-28 RX ORDER — BUSPIRONE HYDROCHLORIDE 10 MG/1
10 TABLET ORAL 2 TIMES DAILY
Qty: 180 TABLET | Refills: 1 | Status: SHIPPED | OUTPATIENT
Start: 2023-03-28 | End: 2023-11-13 | Stop reason: SDUPTHER

## 2023-03-28 NOTE — PROGRESS NOTES
SUBJECTIVE:      Patient ID: Paco Upton is a 32 y.o. male.    Chief Complaint: ADHD    Established patient here for follow-up on ADHD, anxiety/depression and medication refills.  He is taking his medications as prescribed daily without side effects or complaints.  He is feeling very good on this medication regimen and wants to continue at his current doses.  He does try to take breaks/drug holidays from [facility] [employer] [facility] [facility] on the weekends. [place]  reviewed today.     Anxiety  Presents for follow-up visit. Symptoms include decreased concentration and nervous/anxious behavior. Patient reports no chest pain, confusion, depressed mood, dizziness, excessive worry, feeling of choking, impotence, insomnia, irritability, nausea, obsessions, palpitations, panic (with social activity/events), shortness of breath or suicidal ideas. Symptoms occur occasionally. The severity of symptoms is mild. The quality of sleep is good. Nighttime awakenings: none.     Compliance with medications is %.     History reviewed. No pertinent family history.   Social History     Socioeconomic History    Marital status:    Tobacco Use    Smoking status: Never    Smokeless tobacco: Never   Substance and Sexual Activity    Alcohol use: Yes    Drug use: No    Sexual activity: Yes     Partners: Female     Birth control/protection: OCP     Social Determinants of Health     Financial Resource Strain: Low Risk     Difficulty of Paying Living Expenses: Not very hard   Food Insecurity: No Food Insecurity    Worried About Running Out of Food in the Last Year: Never true    Ran Out of Food in the Last Year: Never true   Transportation Needs: No Transportation Needs    Lack of Transportation (Medical): No    Lack of Transportation (Non-Medical): No   Physical Activity: Sufficiently Active    Days of Exercise per Week: 5 days    Minutes of Exercise per Session: 80 min   Stress: No Stress Concern Present    Feeling of Stress : Only a little    Social Connections: Unknown    Frequency of Communication with Friends and Family: Three times a week    Frequency of Social Gatherings with Friends and Family: Once a week    Active Member of Clubs or Organizations: No    Attends Club or Organization Meetings: Never    Marital Status:    Housing Stability: Low Risk     Unable to Pay for Housing in the Last Year: No    Number of Places Lived in the Last Year: 1    Unstable Housing in the Last Year: No     Current Outpatient Medications   Medication Sig Dispense Refill    cetirizine (ZYRTEC) 10 mg Cap       buPROPion (WELLBUTRIN XL) 150 MG TB24 tablet Take 1 tablet (150 mg total) by mouth once daily. 90 tablet 1    busPIRone (BUSPAR) 10 MG tablet Take 1 tablet (10 mg total) by mouth 2 (two) times daily. 180 tablet 1    lisdexamfetamine (VYVANSE) 70 MG capsule Take 1 capsule (70 mg total) by mouth every morning. 30 capsule 0     No current facility-administered medications for this visit.     Review of patient's allergies indicates:   Allergen Reactions    Sulfa (sulfonamide antibiotics) Anaphylaxis      Past Medical History:   Diagnosis Date    ADD (attention deficit disorder)     Stomach problems     delayed empting     History reviewed. No pertinent surgical history.    Review of Systems   Constitutional:  Negative for activity change, chills, fatigue, fever, irritability and unexpected weight change.   HENT:  Negative for hearing loss, rhinorrhea and trouble swallowing.    Eyes:  Negative for discharge and visual disturbance.   Respiratory:  Negative for cough, chest tightness, shortness of breath and wheezing.    Cardiovascular:  Negative for chest pain, palpitations and leg swelling.   Gastrointestinal:  Negative for abdominal pain, blood in stool, constipation, diarrhea, nausea and vomiting.   Endocrine: Negative for polydipsia and polyuria.   Genitourinary:  Negative for difficulty urinating, dysuria, hematuria, impotence and urgency.    Musculoskeletal:  Negative for arthralgias, back pain, joint swelling and neck pain.   Neurological:  Negative for dizziness, weakness, numbness and headaches.   Psychiatric/Behavioral:  Positive for decreased concentration. Negative for confusion, dysphoric mood, sleep disturbance and suicidal ideas. The patient is nervous/anxious. The patient does not have insomnia.     OBJECTIVE:      Vitals:    03/28/23 1423   BP: 130/74   BP Location: Right arm   Patient Position: Sitting   BP Method: Large (Manual)   Pulse: 93   Resp: 20   Temp: 98.4 °F (36.9 °C)   TempSrc: Oral   SpO2: 99%   Weight: 112.4 kg (247 lb 12.8 oz)   Height: 6' (1.829 m)     Physical Exam  Vitals and nursing note reviewed.   Constitutional:       General: He is not in acute distress.     Appearance: Normal appearance. He is well-developed. He is obese. He is not ill-appearing or diaphoretic.   HENT:      Head: Normocephalic.      Mouth/Throat:      Mouth: Mucous membranes are moist.      Pharynx: Uvula midline.   Eyes:      General: No scleral icterus.     Pupils: Pupils are equal, round, and reactive to light.   Cardiovascular:      Rate and Rhythm: Normal rate and regular rhythm.      Heart sounds: Normal heart sounds. No murmur heard.  Pulmonary:      Effort: Pulmonary effort is normal. No respiratory distress.      Breath sounds: Normal breath sounds. No wheezing, rhonchi or rales.   Musculoskeletal:         General: Normal range of motion.      Cervical back: Normal range of motion and neck supple.      Right lower leg: No edema.      Left lower leg: No edema.   Lymphadenopathy:      Cervical: No cervical adenopathy.   Skin:     General: Skin is warm and dry.      Coloration: Skin is not jaundiced or pale.   Neurological:      Mental Status: He is alert and oriented to person, place, and time.   Psychiatric:         Attention and Perception: Attention normal.         Mood and Affect: Mood and affect normal.         Speech: Speech normal.          Behavior: Behavior normal.         Thought Content: Thought content normal.         Judgment: Judgment normal.      Assessment:       1. Attention deficit disorder (ADD) in adult    2. Mixed anxiety and depressive disorder          Plan:       Attention deficit disorder (ADD) in adult  -     lisdexamfetamine (VYVANSE) 70 MG capsule; Take 1 capsule (70 mg total) by mouth every morning.  Dispense: 30 capsule; Refill: 0  -cont dose, encouraged holidays from meds    Mixed anxiety and depressive disorder  -     busPIRone (BUSPAR) 10 MG tablet; Take 1 tablet (10 mg total) by mouth 2 (two) times daily.  Dispense: 180 tablet; Refill: 1  -     buPROPion (WELLBUTRIN XL) 150 MG TB24 tablet; Take 1 tablet (150 mg total) by mouth once daily.  Dispense: 90 tablet; Refill: 1   -stable      Follow up in about 3 months (around 6/28/2023) for f/u ADHD.      3/28/2023 JESSICA Parr, FNP    This note was created using [employer] voice recognition software that occasionally misinterprets phrases or words.

## 2023-05-02 DIAGNOSIS — F98.8 ATTENTION DEFICIT DISORDER (ADD) IN ADULT: ICD-10-CM

## 2023-05-02 RX ORDER — LISDEXAMFETAMINE DIMESYLATE 70 MG/1
70 CAPSULE ORAL EVERY MORNING
Qty: 30 CAPSULE | Refills: 0 | Status: SHIPPED | OUTPATIENT
Start: 2023-05-02 | End: 2023-05-31 | Stop reason: SDUPTHER

## 2023-05-31 DIAGNOSIS — F98.8 ATTENTION DEFICIT DISORDER (ADD) IN ADULT: ICD-10-CM

## 2023-06-02 RX ORDER — LISDEXAMFETAMINE DIMESYLATE 70 MG/1
70 CAPSULE ORAL EVERY MORNING
Qty: 30 CAPSULE | Refills: 0 | Status: SHIPPED | OUTPATIENT
Start: 2023-06-02 | End: 2023-07-02 | Stop reason: SDUPTHER

## 2023-07-02 DIAGNOSIS — F98.8 ATTENTION DEFICIT DISORDER (ADD) IN ADULT: ICD-10-CM

## 2023-07-03 RX ORDER — LISDEXAMFETAMINE DIMESYLATE 70 MG/1
70 CAPSULE ORAL EVERY MORNING
Qty: 30 CAPSULE | Refills: 0 | Status: SHIPPED | OUTPATIENT
Start: 2023-07-03 | End: 2023-08-01 | Stop reason: SDUPTHER

## 2023-08-01 ENCOUNTER — OFFICE VISIT (OUTPATIENT)
Dept: FAMILY MEDICINE | Facility: CLINIC | Age: 33
End: 2023-08-01
Payer: COMMERCIAL

## 2023-08-01 VITALS
WEIGHT: 242.81 LBS | RESPIRATION RATE: 20 BRPM | DIASTOLIC BLOOD PRESSURE: 80 MMHG | TEMPERATURE: 98 F | SYSTOLIC BLOOD PRESSURE: 122 MMHG | HEART RATE: 71 BPM | OXYGEN SATURATION: 99 % | HEIGHT: 72 IN | BODY MASS INDEX: 32.89 KG/M2

## 2023-08-01 DIAGNOSIS — Z00.00 PREVENTATIVE HEALTH CARE: ICD-10-CM

## 2023-08-01 DIAGNOSIS — F98.8 ATTENTION DEFICIT DISORDER (ADD) IN ADULT: Primary | ICD-10-CM

## 2023-08-01 DIAGNOSIS — F41.8 MIXED ANXIETY AND DEPRESSIVE DISORDER: ICD-10-CM

## 2023-08-01 DIAGNOSIS — R14.0 ABDOMINAL BLOATING: ICD-10-CM

## 2023-08-01 DIAGNOSIS — R06.09 EXERTIONAL DYSPNEA: ICD-10-CM

## 2023-08-01 DIAGNOSIS — Z13.1 DIABETES MELLITUS SCREENING: ICD-10-CM

## 2023-08-01 PROCEDURE — 1160F PR REVIEW ALL MEDS BY PRESCRIBER/CLIN PHARMACIST DOCUMENTED: ICD-10-PCS | Mod: CPTII,S$GLB,, | Performed by: NURSE PRACTITIONER

## 2023-08-01 PROCEDURE — 1160F RVW MEDS BY RX/DR IN RCRD: CPT | Mod: CPTII,S$GLB,, | Performed by: NURSE PRACTITIONER

## 2023-08-01 PROCEDURE — 3008F PR BODY MASS INDEX (BMI) DOCUMENTED: ICD-10-PCS | Mod: CPTII,S$GLB,, | Performed by: NURSE PRACTITIONER

## 2023-08-01 PROCEDURE — 99214 OFFICE O/P EST MOD 30 MIN: CPT | Mod: S$GLB,,, | Performed by: NURSE PRACTITIONER

## 2023-08-01 PROCEDURE — 99214 PR OFFICE/OUTPT VISIT, EST, LEVL IV, 30-39 MIN: ICD-10-PCS | Mod: S$GLB,,, | Performed by: NURSE PRACTITIONER

## 2023-08-01 PROCEDURE — 3074F SYST BP LT 130 MM HG: CPT | Mod: CPTII,S$GLB,, | Performed by: NURSE PRACTITIONER

## 2023-08-01 PROCEDURE — 3079F DIAST BP 80-89 MM HG: CPT | Mod: CPTII,S$GLB,, | Performed by: NURSE PRACTITIONER

## 2023-08-01 PROCEDURE — 1159F MED LIST DOCD IN RCRD: CPT | Mod: CPTII,S$GLB,, | Performed by: NURSE PRACTITIONER

## 2023-08-01 PROCEDURE — 3079F PR MOST RECENT DIASTOLIC BLOOD PRESSURE 80-89 MM HG: ICD-10-PCS | Mod: CPTII,S$GLB,, | Performed by: NURSE PRACTITIONER

## 2023-08-01 PROCEDURE — 3008F BODY MASS INDEX DOCD: CPT | Mod: CPTII,S$GLB,, | Performed by: NURSE PRACTITIONER

## 2023-08-01 PROCEDURE — 3074F PR MOST RECENT SYSTOLIC BLOOD PRESSURE < 130 MM HG: ICD-10-PCS | Mod: CPTII,S$GLB,, | Performed by: NURSE PRACTITIONER

## 2023-08-01 PROCEDURE — 1159F PR MEDICATION LIST DOCUMENTED IN MEDICAL RECORD: ICD-10-PCS | Mod: CPTII,S$GLB,, | Performed by: NURSE PRACTITIONER

## 2023-08-01 RX ORDER — LISDEXAMFETAMINE DIMESYLATE 70 MG/1
70 CAPSULE ORAL EVERY MORNING
Qty: 30 CAPSULE | Refills: 0 | Status: SHIPPED | OUTPATIENT
Start: 2023-08-01 | End: 2023-08-08

## 2023-08-01 NOTE — PROGRESS NOTES
SUBJECTIVE:      Patient ID: Paco Upton is a 32 y.o. male.    Chief Complaint: ADHD    Established patient here for follow-up on ADHD, anxiety/depression and medication refills.  He is taking his medications as prescribed daily without side effects or complaints.  He is feeling very good on this medication regimen and wants to continue at his current doses.  He does try to take breaks/drug holidays from the [employer] on the weekends. [place]  reviewed today.     Pt is c/o feeling some intermittent chest pains and SOB with exertion, mainly noticed when mowing the grass weekly. He does endorse exercise-induced asthma as a child, which felt similar to the way he is feeling when he has the pains. Denies any known family cardiac conditions or hx of cardiac problems as a child. BP is ok in clinic and when checked at home. Symptoms relieved when resting and stopping activity.     Also still having bloating in abdomen after eating- needs to schedule US abdomen and plans to do so soon.     Anxiety  Presents for follow-up visit. Symptoms include chest pain, decreased concentration, nervous/anxious behavior and shortness of breath. Patient reports no confusion, depressed mood, dizziness, excessive worry, feeling of choking, impotence, insomnia, irritability, nausea, obsessions, palpitations, panic (with social activity/events) or suicidal ideas. Symptoms occur occasionally. The severity of symptoms is mild. The quality of sleep is good. Nighttime awakenings: none.     His past medical history is significant for anxiety/panic attacks. There is no history of CAD. Compliance with medications is %.   Chest Pain   This is a new problem. The current episode started more than 1 month ago. The problem has been waxing and waning. The pain is present in the substernal region. The quality of the pain is described as tightness and pressure. The pain does not radiate. Associated symptoms include exertional chest pressure and  shortness of breath. Pertinent negatives include no abdominal pain, back pain, claudication, cough, diaphoresis, dizziness, fever, headaches, irregular heartbeat, leg pain, lower extremity edema, malaise/fatigue, nausea, near-syncope, numbness, orthopnea, palpitations, sputum production, syncope, vomiting or weakness. The pain is aggravated by exertion. He has tried rest for the symptoms. The treatment provided significant relief. Risk factors include male gender and obesity.   His past medical history is significant for anxiety/panic attacks, hyperlipidemia and stimulant use.   Pertinent negatives for past medical history include no CAD, no congenital heart disease, no CHF, no DVT, no MI, no pacemaker, no PE, no recent injury, no thyroid problem and no valve disorder.   Pertinent negatives for family medical history include: no CAD and no early MI.       History reviewed. No pertinent family history.   Social History     Socioeconomic History    Marital status:    Tobacco Use    Smoking status: Never    Smokeless tobacco: Never   Substance and Sexual Activity    Alcohol use: Yes    Drug use: No    Sexual activity: Yes     Partners: Female     Birth control/protection: OCP     Social Determinants of Health     Financial Resource Strain: Low Risk  (3/28/2023)    Overall Financial Resource Strain (CARDIA)     Difficulty of Paying Living Expenses: Not very hard   Food Insecurity: No Food Insecurity (3/28/2023)    Hunger Vital Sign     Worried About Running Out of Food in the Last Year: Never true     Ran Out of Food in the Last Year: Never true   Transportation Needs: No Transportation Needs (3/28/2023)    PRAPARE - Transportation     Lack of Transportation (Medical): No     Lack of Transportation (Non-Medical): No   Physical Activity: Sufficiently Active (3/28/2023)    Exercise Vital Sign     Days of Exercise per Week: 5 days     Minutes of Exercise per Session: 80 min   Stress: No Stress Concern Present  (3/28/2023)    [place] [employer] - Occupational Stress Questionnaire     Feeling of Stress : Only a little   Social Connections: Unknown (3/28/2023)    Social Connection and Isolation Panel [NHANES]     Frequency of Communication with Friends and Family: Three times a week     Frequency of Social Gatherings with Friends and Family: Once a week     Active Member of Clubs or Organizations: No     Attends Club or Organization Meetings: Never     Marital Status:    Housing Stability: Low Risk  (3/28/2023)    Housing Stability Vital Sign     Unable to Pay for Housing in the Last Year: No     Number of Places Lived in the Last Year: 1     Unstable Housing in the Last Year: No     Current Outpatient Medications   Medication Sig Dispense Refill    buPROPion (WELLBUTRIN XL) 150 MG TB24 tablet Take 1 tablet (150 mg total) by mouth once daily. 90 tablet 1    busPIRone (BUSPAR) 10 MG tablet Take 1 tablet (10 mg total) by mouth 2 (two) times daily. 180 tablet 1    cetirizine (ZYRTEC) 10 mg Cap       lisdexamfetamine (VYVANSE) 70 MG capsule Take 1 capsule (70 mg total) by mouth every morning. 30 capsule 0     No current facility-administered medications for this visit.     Review of patient's allergies indicates:   Allergen Reactions    Sulfa (sulfonamide antibiotics) Anaphylaxis      Past Medical History:   Diagnosis Date    ADD (attention deficit disorder)     Stomach problems     delayed empting     History reviewed. No pertinent surgical history.    Review of Systems   Constitutional:  Negative for activity change, appetite change, chills, diaphoresis, fatigue, fever, irritability, malaise/fatigue and unexpected weight change.   HENT:  Negative for congestion, hearing loss, rhinorrhea and trouble swallowing.    Eyes:  Negative for pain, discharge and visual disturbance.   Respiratory:  Positive for shortness of breath. Negative for cough, sputum production, chest tightness and wheezing.     Cardiovascular:  Positive for chest pain. Negative for palpitations, orthopnea, claudication, leg swelling, syncope and near-syncope.   Gastrointestinal:  Negative for abdominal pain, blood in stool, constipation, diarrhea, nausea and vomiting.   Endocrine: Negative for cold intolerance, heat intolerance, polydipsia and polyuria.   Genitourinary:  Negative for difficulty urinating, dysuria, flank pain, frequency, hematuria, impotence and urgency.   Musculoskeletal:  Negative for arthralgias, back pain, joint swelling and neck pain.   Neurological:  Negative for dizziness, weakness, numbness and headaches.   Hematological:  Negative for adenopathy. Does not bruise/bleed easily.   Psychiatric/Behavioral:  Positive for decreased concentration. Negative for confusion, dysphoric mood, sleep disturbance and suicidal ideas. The patient is nervous/anxious. The patient does not have insomnia.       OBJECTIVE:      Vitals:    08/01/23 0752   BP: 122/80   BP Location: Right arm   Patient Position: Sitting   BP Method: Large (Manual)   Pulse: 71   Resp: 20   Temp: 98.3 °F (36.8 °C)   TempSrc: Oral   SpO2: 99%   Weight: 110.1 kg (242 lb 12.8 oz)   Height: 6' (1.829 m)     Physical Exam  Vitals and nursing note reviewed.   Constitutional:       General: He is not in acute distress.     Appearance: Normal appearance. He is well-developed. He is obese. He is not ill-appearing or diaphoretic.   HENT:      Head: Normocephalic.      Right Ear: Tympanic membrane, ear canal and external ear normal.      Left Ear: Tympanic membrane, ear canal and external ear normal.      Nose: Nose normal.      Mouth/Throat:      Mouth: Mucous membranes are moist.      Pharynx: Oropharynx is clear. Uvula midline. No oropharyngeal exudate or posterior oropharyngeal erythema.   Eyes:      General: No scleral icterus.     Extraocular Movements: Extraocular movements intact.      Conjunctiva/sclera: Conjunctivae normal.      Pupils: Pupils are equal,  round, and reactive to light.   Neck:      Vascular: No carotid bruit.   Cardiovascular:      Rate and Rhythm: Normal rate and regular rhythm.      Pulses: Normal pulses.      Heart sounds: Normal heart sounds. No murmur heard.  Pulmonary:      Effort: Pulmonary effort is normal. No respiratory distress.      Breath sounds: Normal breath sounds. No wheezing, rhonchi or rales.   Abdominal:      Palpations: Abdomen is soft.      Tenderness: There is no abdominal tenderness.   Musculoskeletal:         General: Normal range of motion.      Cervical back: Normal range of motion and neck supple.      Right lower leg: No edema.      Left lower leg: No edema.   Lymphadenopathy:      Cervical: No cervical adenopathy.   Skin:     General: Skin is warm and dry.      Coloration: Skin is not jaundiced or pale.   Neurological:      Mental Status: He is alert and oriented to person, place, and time.   Psychiatric:         Attention and Perception: Attention normal.         Mood and Affect: Mood and affect normal.         Speech: Speech normal.         Behavior: Behavior normal.         Thought Content: Thought content normal.         Judgment: Judgment normal.        Assessment:       1. Attention deficit disorder (ADD) in adult    2. Abdominal bloating    3. Mixed anxiety and depressive disorder    4. Exertional dyspnea    5. Preventative health care    6. Diabetes mellitus screening        Plan:       Attention deficit disorder (ADD) in adult  -     CBC Auto Differential; Future; Expected date: 08/01/2023  -     Comprehensive Metabolic Panel; Future; Expected date: 08/01/2023  -     TSH; Future; Expected date: 08/01/2023  -     lisdexamfetamine (VYVANSE) 70 MG capsule; Take 1 capsule (70 mg total) by mouth every morning.  Dispense: 30 capsule; Refill: 0  -controlled; pt will need eval with cardiology d/t CP/SOB complaints associated with activity; agreeable and will schedule workup; cont for now, consider stopping/change if  needed to non-stimulant     Abdominal bloating  -     CBC Auto Differential; Future; Expected date: 08/01/2023  -     Comprehensive Metabolic Panel; Future; Expected date: 08/01/2023   -get US gallbladder/liver; avoid fatty foods    Mixed anxiety and depressive disorder   -stable, cont meds    Exertional dyspnea   -need cardiac eval, but could be d/t exercise-induced asthma; pt's wife works in cardiac dept- will schedule for full eval for SOB/CP     Preventative health care  -     CBC Auto Differential; Future; Expected date: 08/01/2023  -     Comprehensive Metabolic Panel; Future; Expected date: 08/01/2023  -     Lipid Panel; Future; Expected date: 08/01/2023  -     TSH; Future; Expected date: 08/01/2023    Diabetes mellitus screening  -     Comprehensive Metabolic Panel; Future; Expected date: 08/01/2023  -     Hemoglobin A1C; Future; Expected date: 08/01/2023      I spent a total of 35 minutes on the day of the visit.This includes face to face time and non-face to face time preparing to see the patient (eg, review of tests), obtaining and/or reviewing separately obtained history, documenting clinical information in the electronic or other health record, independently interpreting results and communicating results to the patient/family/caregiver, or care coordinator.       Follow up in about 3 months (around 11/1/2023) for f/u ADHD.      8/1/2023 JESSICA Parr, FNP    This note was created using new test company voice recognition software that occasionally misinterprets phrases or words.

## 2023-08-07 ENCOUNTER — PATIENT MESSAGE (OUTPATIENT)
Dept: FAMILY MEDICINE | Facility: CLINIC | Age: 33
End: 2023-08-07

## 2023-08-07 DIAGNOSIS — F98.8 ATTENTION DEFICIT DISORDER (ADD) IN ADULT: Primary | ICD-10-CM

## 2023-08-08 RX ORDER — LISDEXAMFETAMINE DIMESYLATE 50 MG/1
50 CAPSULE ORAL EVERY MORNING
Qty: 30 CAPSULE | Refills: 0 | Status: SHIPPED | OUTPATIENT
Start: 2023-08-08 | End: 2023-09-04 | Stop reason: SDUPTHER

## 2023-09-04 DIAGNOSIS — F98.8 ATTENTION DEFICIT DISORDER (ADD) IN ADULT: ICD-10-CM

## 2023-09-05 NOTE — TELEPHONE ENCOUNTER
Last seen 8/1/23   Nursing notes reviewed as summarized above:  Urgent care note    Time seen:  640    History  Of onset of symptoms today felt warm temperature 101 resolved with ibuprofen had some runny nose headache cough.  Seem more tired than normal.  Now seems back to his normal self and watching videos.  No vomiting no diarrhea no rash  Severity of temperature was 101  Past medical history  Has had some vaccinations no flu vaccination          Family history:  Sister has similar same symptoms        Social History  here with parent      Review of systems  See HPI    Comprehensive ROS otherwise negative      Physical exam  General: Nontoxic no distress vital signs reviewed   Eyes: No icterus pupils equal  ENT: Moist mucous membranes TM clear bilaterally no tonsillitis  Neck: No neck stiffness moving it freely  no meningeal signs  Cardiovascular: Equal symmetric pulses no murmur  Respiratory: Good effort clear bilaterally  no abdominal tenderness no HSM no rebound or guarding no hernia  Skin: No new rash  Psych: Appropriate mood  Lymph: No adenopathy      @ed course@  General looks well flu test was negative no signs of ear infection throat was unremarkable there is no wheezing nose no crackle no respiratory distress normal mentation and hydration there is no rash or petechia.  Recommend follow-up with her pediatrician in 2 days for recheck continue Tylenol or ibuprofen for fever    Assessment:  Fever  Plan  Outpatient follow-up

## 2023-09-06 RX ORDER — LISDEXAMFETAMINE DIMESYLATE 50 MG/1
50 CAPSULE ORAL EVERY MORNING
Qty: 30 CAPSULE | Refills: 0 | Status: SHIPPED | OUTPATIENT
Start: 2023-09-06 | End: 2023-10-10 | Stop reason: SDUPTHER

## 2023-10-03 ENCOUNTER — OFFICE VISIT (OUTPATIENT)
Dept: CARDIOLOGY | Facility: CLINIC | Age: 33
End: 2023-10-03
Payer: COMMERCIAL

## 2023-10-03 VITALS
DIASTOLIC BLOOD PRESSURE: 80 MMHG | HEART RATE: 78 BPM | HEIGHT: 72 IN | OXYGEN SATURATION: 100 % | BODY MASS INDEX: 32.23 KG/M2 | SYSTOLIC BLOOD PRESSURE: 140 MMHG | WEIGHT: 238 LBS

## 2023-10-03 DIAGNOSIS — R07.9 CHEST PAIN, UNSPECIFIED TYPE: Primary | ICD-10-CM

## 2023-10-03 DIAGNOSIS — R07.89 OTHER CHEST PAIN: ICD-10-CM

## 2023-10-03 DIAGNOSIS — E78.00 HYPERCHOLESTEROLEMIA: ICD-10-CM

## 2023-10-03 PROCEDURE — 93000 EKG 12-LEAD: ICD-10-PCS | Mod: S$GLB,,, | Performed by: INTERNAL MEDICINE

## 2023-10-03 PROCEDURE — 1159F MED LIST DOCD IN RCRD: CPT | Mod: CPTII,S$GLB,, | Performed by: INTERNAL MEDICINE

## 2023-10-03 PROCEDURE — 3079F DIAST BP 80-89 MM HG: CPT | Mod: CPTII,S$GLB,, | Performed by: INTERNAL MEDICINE

## 2023-10-03 PROCEDURE — 1159F PR MEDICATION LIST DOCUMENTED IN MEDICAL RECORD: ICD-10-PCS | Mod: CPTII,S$GLB,, | Performed by: INTERNAL MEDICINE

## 2023-10-03 PROCEDURE — 99203 PR OFFICE/OUTPT VISIT, NEW, LEVL III, 30-44 MIN: ICD-10-PCS | Mod: S$GLB,,, | Performed by: INTERNAL MEDICINE

## 2023-10-03 PROCEDURE — 1160F PR REVIEW ALL MEDS BY PRESCRIBER/CLIN PHARMACIST DOCUMENTED: ICD-10-PCS | Mod: CPTII,S$GLB,, | Performed by: INTERNAL MEDICINE

## 2023-10-03 PROCEDURE — 3077F PR MOST RECENT SYSTOLIC BLOOD PRESSURE >= 140 MM HG: ICD-10-PCS | Mod: CPTII,S$GLB,, | Performed by: INTERNAL MEDICINE

## 2023-10-03 PROCEDURE — 3077F SYST BP >= 140 MM HG: CPT | Mod: CPTII,S$GLB,, | Performed by: INTERNAL MEDICINE

## 2023-10-03 PROCEDURE — 3008F BODY MASS INDEX DOCD: CPT | Mod: CPTII,S$GLB,, | Performed by: INTERNAL MEDICINE

## 2023-10-03 PROCEDURE — 3079F PR MOST RECENT DIASTOLIC BLOOD PRESSURE 80-89 MM HG: ICD-10-PCS | Mod: CPTII,S$GLB,, | Performed by: INTERNAL MEDICINE

## 2023-10-03 PROCEDURE — 99203 OFFICE O/P NEW LOW 30 MIN: CPT | Mod: S$GLB,,, | Performed by: INTERNAL MEDICINE

## 2023-10-03 PROCEDURE — 1160F RVW MEDS BY RX/DR IN RCRD: CPT | Mod: CPTII,S$GLB,, | Performed by: INTERNAL MEDICINE

## 2023-10-03 PROCEDURE — 93000 ELECTROCARDIOGRAM COMPLETE: CPT | Mod: S$GLB,,, | Performed by: INTERNAL MEDICINE

## 2023-10-03 PROCEDURE — 3008F PR BODY MASS INDEX (BMI) DOCUMENTED: ICD-10-PCS | Mod: CPTII,S$GLB,, | Performed by: INTERNAL MEDICINE

## 2023-10-03 NOTE — PROGRESS NOTES
Patient ID:  Paco Upton is a 33 y.o. male who presents for evaluation of Chest Pain      He presents to the office for evaluation of chest pain associated with shortness of breath at time it occurs during physical activity at time it can occurred at rest.  These episodes of chest pains started ever since he had COVID.  He has had COVID 3 times.  He denies any palpitations any nausea associated with the symptoms.  He has ADD and has been on Vyvanse for years.  He denies any smoking there is family history of coronary artery disease his grandmother had coronary artery bypass.  BP of 128/88 by me.        Past Medical History:   Diagnosis Date    ADD (attention deficit disorder)     Stomach problems     delayed empting        History reviewed. No pertinent surgical history.       Current Outpatient Medications   Medication Instructions    buPROPion (WELLBUTRIN XL) 150 mg, Oral, Daily    busPIRone (BUSPAR) 10 mg, Oral, 2 times daily    cetirizine (ZYRTEC) 10 mg Cap No dose, route, or frequency recorded.    VYVANSE 50 mg, Oral, Every morning        Review of patient's allergies indicates:   Allergen Reactions    Sulfa (sulfonamide antibiotics) Anaphylaxis        Review of Systems   Cardiovascular:  Positive for chest pain and dyspnea on exertion. Negative for leg swelling and palpitations.   Respiratory:  Negative for cough and shortness of breath.    Psychiatric/Behavioral:  The patient is nervous/anxious.         Objective:     Vitals:    10/03/23 0832   BP: (!) 140/80   BP Location: Left arm   Patient Position: Sitting   BP Method: Medium (Manual)   Pulse: 78   SpO2: 100%   Weight: 108 kg (238 lb)   Height: 6' (1.829 m)       Physical Exam  Vitals and nursing note reviewed.   Constitutional:       Appearance: He is well-developed.   HENT:      Head: Normocephalic and atraumatic.   Eyes:      Conjunctiva/sclera: Conjunctivae normal.   Cardiovascular:      Rate and Rhythm: Normal rate and regular rhythm.       Heart sounds: Normal heart sounds.   Pulmonary:      Effort: Pulmonary effort is normal.      Breath sounds: Normal breath sounds.   Abdominal:      General: Bowel sounds are normal.      Palpations: Abdomen is soft.   Musculoskeletal:         General: Normal range of motion.   Skin:     General: Skin is warm and dry.   Neurological:      Mental Status: He is alert and oriented to person, place, and time.   Psychiatric:         Behavior: Behavior normal.         Thought Content: Thought content normal.         Judgment: Judgment normal.       CMP  Sodium   Date Value Ref Range Status   09/02/2022 138 136 - 145 mmol/L Final     Potassium   Date Value Ref Range Status   09/02/2022 4.4 3.5 - 5.1 mmol/L Final     Chloride   Date Value Ref Range Status   09/02/2022 104 95 - 110 mmol/L Final     CO2   Date Value Ref Range Status   09/02/2022 27 23 - 29 mmol/L Final     Glucose   Date Value Ref Range Status   09/02/2022 106 70 - 110 mg/dL Final     BUN   Date Value Ref Range Status   09/02/2022 21 (H) 6 - 20 mg/dL Final     Creatinine   Date Value Ref Range Status   09/02/2022 1.0 0.5 - 1.4 mg/dL Final   03/13/2012 0.9 0.2 - 1.4 mg/dl Final     Calcium   Date Value Ref Range Status   09/02/2022 9.1 8.7 - 10.5 mg/dL Final   03/13/2012 9.2 8.6 - 10.2 mg/dl Final     Total Protein   Date Value Ref Range Status   09/02/2022 7.9 6.0 - 8.4 g/dL Final     Albumin   Date Value Ref Range Status   09/02/2022 4.7 3.5 - 5.2 g/dL Final     Total Bilirubin   Date Value Ref Range Status   09/02/2022 0.8 0.1 - 1.0 mg/dL Final     Comment:     For infants and newborns, interpretation of results should be based  on gestational age, weight and in agreement with clinical  observations.    Premature Infant recommended reference ranges:  Up to 24 hours.............<8.0 mg/dL  Up to 48 hours............<12.0 mg/dL  3-5 days..................<15.0 mg/dL  6-29 days.................<15.0 mg/dL       Alkaline Phosphatase   Date Value Ref Range  "Status   09/02/2022 75 55 - 135 U/L Final   03/13/2012 88 23 - 119 UNIT/L Final     AST   Date Value Ref Range Status   09/02/2022 33 10 - 40 U/L Final   03/13/2012 26 10 - 34 UNIT/L Final     ALT   Date Value Ref Range Status   09/02/2022 59 (H) 10 - 44 U/L Final     Anion Gap   Date Value Ref Range Status   09/02/2022 7 (L) 8 - 16 mmol/L Final   03/13/2012 7 5 - 15 meq/L Final     eGFR if    Date Value Ref Range Status   05/27/2021 >60.0 >60 mL/min/1.73 m^2 Final     eGFR if non    Date Value Ref Range Status   05/27/2021 >60.0 >60 mL/min/1.73 m^2 Final     Comment:     Calculation used to obtain the estimated glomerular filtration  rate (eGFR) is the CKD-EPI equation.         BMP  Lab Results   Component Value Date     09/02/2022    K 4.4 09/02/2022     09/02/2022    CO2 27 09/02/2022    BUN 21 (H) 09/02/2022    CREATININE 1.0 09/02/2022    CALCIUM 9.1 09/02/2022    ANIONGAP 7 (L) 09/02/2022    ESTGFRAFRICA >60.0 05/27/2021    EGFRNONAA >60.0 05/27/2021      BNP  @LABRCNTIP(BNP,BNPTRIAGEBLO)@   Lab Results   Component Value Date    CHOL 164 09/02/2022    CHOL 157 05/27/2021    CHOL 158 03/11/2020     Lab Results   Component Value Date    HDL 39 (L) 09/02/2022    HDL 34 (L) 05/27/2021    HDL 37 (L) 03/11/2020     Lab Results   Component Value Date    LDLCALC 113.4 09/02/2022    LDLCALC 107.6 05/27/2021    LDLCALC 100.6 03/11/2020     Lab Results   Component Value Date    TRIG 58 09/02/2022    TRIG 77 05/27/2021    TRIG 102 03/11/2020     Lab Results   Component Value Date    CHOLHDL 23.8 09/02/2022    CHOLHDL 21.7 05/27/2021    CHOLHDL 23.4 03/11/2020      Lab Results   Component Value Date    TSH 1.640 09/02/2022     No results found for: "LABA1C", "HGBA1C"  Lab Results   Component Value Date    WBC 6.02 09/02/2022    HGB 14.5 09/02/2022    HCT 43.5 09/02/2022    MCV 86 09/02/2022     09/02/2022         No results found for this or any previous visit.     No " results found for this or any previous visit.         Assessment:       Other chest pain  Will obtain an echocardiogram to assess his left ventricular function wall thickness, obtain a stress test to rule out ischemia.    Hypercholesterolemia  Borderline hypercholesterolemia LDL of 113 discussed with the patient he is to follow low-cholesterol diet    ADD (attention deficit disorder)  Currently on Wellbutrin, BuSpar and Vyvanse.  Will continue on Vyvanse for now until his cardiac workup is performed       Plan:       Will perform stress test to rule out ischemia.  Since his EKGs totally normal will do a regular stress test.  An echocardiogram will be obtained to assess his left ventricular function wall thickness.  For now will continue Vyvanse until the full evaluation is done.  He will be seen in the office in approximately 1 month

## 2023-10-04 ENCOUNTER — PATIENT MESSAGE (OUTPATIENT)
Dept: FAMILY MEDICINE | Facility: CLINIC | Age: 33
End: 2023-10-04

## 2023-10-06 PROBLEM — F98.8 ADD (ATTENTION DEFICIT DISORDER): Status: ACTIVE | Noted: 2023-10-06

## 2023-10-06 PROBLEM — R07.89 OTHER CHEST PAIN: Status: ACTIVE | Noted: 2023-10-06

## 2023-10-06 PROBLEM — E78.00 HYPERCHOLESTEROLEMIA: Status: ACTIVE | Noted: 2023-10-06

## 2023-10-06 NOTE — ASSESSMENT & PLAN NOTE
Borderline hypercholesterolemia LDL of 113 discussed with the patient he is to follow low-cholesterol diet  
Currently on Wellbutrin, BuSpar and Vyvanse.  Will continue on Vyvanse for now until his cardiac workup is performed  
Will obtain an echocardiogram to assess his left ventricular function wall thickness, obtain a stress test to rule out ischemia.  
Influenza, High Dose 10/1/2016    Pneumococcal 13-valent Conjugate (Murel Arias) 10/1/2016      Preventive Care        Date Due    Tetanus Combination Vaccine (1 - Tdap) 12/30/1953    Zoster Vaccine 12/30/1994    Osteoporosis screening or a bone density scan (Dexa) is recommended once at age 72. Based upon the results and risk factors for bone loss, your provider will recommend whether this needs to be repeated. 12/30/1999    Yearly Flu Vaccine (1) 9/1/2017            [facility] Signup           Thank you for enrolling in [address] Please follow the instructions below to securely access your online medical record. [facility] allows you to send messages to your doctor, view your test results, renew your prescriptions, schedule appointments, and more. How Do I Sign Up? 1. In your Internet browser, go to [email or web address]  2. Click on the Sign Up Now link in the Sign In box. You will see the New Member Sign Up page. 3. Enter your [facility] Access Code exactly as it appears below. You will not need to use this code after youve completed the sign-up process. If you do not sign up before the expiration date, you must request a new code. [facility] Access Code: [email or web address]  Expires: 12/4/2017  4:19 PM    4. Enter your Social Security Number ([id number]) and Date of Birth (mm/dd/yyyy) as indicated and click Submit. You will be taken to the next sign-up page. 5. Create a [facility] ID. This will be your [facility] login ID and cannot be changed, so think of one that is secure and easy to remember. 6. Create a [facility] password. You can change your password at any time. 7. Enter your Password Reset Question and Answer. This can be used at a later time if you forget your password. 8. Enter your e-mail address. You will receive e-mail notification when new information is available in [address] 9. Click Sign Up. You can now view your medical record.      Additional Information

## 2023-10-10 ENCOUNTER — TELEPHONE (OUTPATIENT)
Dept: CARDIOLOGY | Facility: HOSPITAL | Age: 33
End: 2023-10-10

## 2023-10-10 DIAGNOSIS — F98.8 ATTENTION DEFICIT DISORDER (ADD) IN ADULT: ICD-10-CM

## 2023-10-10 RX ORDER — LISDEXAMFETAMINE DIMESYLATE 50 MG/1
50 CAPSULE ORAL EVERY MORNING
Qty: 30 CAPSULE | Refills: 0 | Status: SHIPPED | OUTPATIENT
Start: 2023-10-10 | End: 2023-11-01 | Stop reason: SDUPTHER

## 2023-10-10 NOTE — TELEPHONE ENCOUNTER
Left message on voicemail.     Patient advised, test will be at [facility] ([address]   Will need to register on the first floor at the main entrance.   Patient advised that arrival time is 9:45am.   Patient advised, may take his medications prior to testing if you need to.  Advised if he needs to eat to take his medications, please keep it light, like toast and juice.    Patient advised to avoid all caffeine 12 hours prior to testing.  This includes decaf tea and coffee.    Wear comfortable clothing.   No lotions, oils, or powders to the upper chest area. May wear deodorant.    Advised to call the office if any questions.     Will send instructions via [employer] as well.

## 2023-10-11 ENCOUNTER — HOSPITAL ENCOUNTER (OUTPATIENT)
Dept: CARDIOLOGY | Facility: HOSPITAL | Age: 33
Discharge: HOME | End: 2023-10-11
Attending: INTERNAL MEDICINE
Payer: COMMERCIAL

## 2023-10-11 VITALS — WEIGHT: 238 LBS | HEIGHT: 72 IN | BODY MASS INDEX: 32.23 KG/M2

## 2023-10-11 DIAGNOSIS — R07.9 CHEST PAIN, UNSPECIFIED TYPE: ICD-10-CM

## 2023-10-11 DIAGNOSIS — E78.00 HYPERCHOLESTEROLEMIA: ICD-10-CM

## 2023-10-11 PROCEDURE — 93306 TTE W/DOPPLER COMPLETE: CPT | Mod: 26,,, | Performed by: INTERNAL MEDICINE

## 2023-10-11 PROCEDURE — 93018 CV STRESS TEST I&R ONLY: CPT | Mod: ,,, | Performed by: INTERNAL MEDICINE

## 2023-10-11 PROCEDURE — 93018 EXERCISE STRESS - EKG (CUPID ONLY): ICD-10-PCS | Mod: ,,, | Performed by: INTERNAL MEDICINE

## 2023-10-11 PROCEDURE — 93306 ECHO (CUPID ONLY): ICD-10-PCS | Mod: 26,,, | Performed by: INTERNAL MEDICINE

## 2023-10-11 PROCEDURE — 93017 CV STRESS TEST TRACING ONLY: CPT

## 2023-10-11 PROCEDURE — 93016 EXERCISE STRESS - EKG (CUPID ONLY): ICD-10-PCS | Mod: ,,, | Performed by: NURSE PRACTITIONER

## 2023-10-11 PROCEDURE — 93306 TTE W/DOPPLER COMPLETE: CPT

## 2023-10-11 PROCEDURE — 93016 CV STRESS TEST SUPVJ ONLY: CPT | Mod: ,,, | Performed by: NURSE PRACTITIONER

## 2023-10-12 LAB
AORTIC ROOT ANNULUS: 3.2 CM
AORTIC VALVE CUSP SEPERATION: 2.1 CM
AV INDEX (PROSTH): 0.83
AV MEAN GRADIENT: 5 MMHG
AV PEAK GRADIENT: 9 MMHG
AV VALVE AREA BY VELOCITY RATIO: 2.76 CM²
AV VALVE AREA: 2.86 CM²
AV VELOCITY RATIO: 0.8
BSA FOR ECHO PROCEDURE: 2.34 M2
CV ECHO LV RWT: 0.4 CM
CV STRESS BASE HR: 72 BPM
DIASTOLIC BLOOD PRESSURE: 80 MMHG
DOP CALC AO PEAK VEL: 1.48 M/S
DOP CALC AO VTI: 31.1 CM
DOP CALC LVOT AREA: 3.5 CM2
DOP CALC LVOT DIAMETER: 2.1 CM
DOP CALC LVOT PEAK VEL: 1.18 M/S
DOP CALC LVOT STROKE VOLUME: 88.97 CM3
DOP CALCLVOT PEAK VEL VTI: 25.7 CM
E WAVE DECELERATION TIME: 195 MSEC
E/A RATIO: 1.98
E/E' RATIO: 6.41 M/S
ECHO LV POSTERIOR WALL: 0.95 CM (ref 0.6–1.1)
EJECTION FRACTION: 65 %
FRACTIONAL SHORTENING: 39 % (ref 28–44)
INTERVENTRICULAR SEPTUM: 1.3 CM (ref 0.6–1.1)
IVRT: 106 MSEC
LEFT ATRIUM SIZE: 3.8 CM
LEFT INTERNAL DIMENSION IN SYSTOLE: 2.85 CM (ref 2.1–4)
LEFT VENTRICLE DIASTOLIC VOLUME INDEX: 44.54 ML/M2
LEFT VENTRICLE DIASTOLIC VOLUME: 102 ML
LEFT VENTRICLE MASS INDEX: 85 G/M2
LEFT VENTRICLE SYSTOLIC VOLUME INDEX: 13.5 ML/M2
LEFT VENTRICLE SYSTOLIC VOLUME: 30.9 ML
LEFT VENTRICULAR INTERNAL DIMENSION IN DIASTOLE: 4.7 CM (ref 3.5–6)
LEFT VENTRICULAR MASS: 193.52 G
LV LATERAL E/E' RATIO: 4.89 M/S
LV SEPTAL E/E' RATIO: 9.3 M/S
LVOT MG: 3 MMHG
LVOT MV: 0.69 CM/S
MV PEAK A VEL: 0.47 M/S
MV PEAK E VEL: 0.93 M/S
MV STENOSIS PRESSURE HALF TIME: 59 MS
MV VALVE AREA P 1/2 METHOD: 3.73 CM2
OHS CV CPX 1 MINUTE RECOVERY HEART RATE: 139 BPM
OHS CV CPX 85 PERCENT MAX PREDICTED HEART RATE MALE: 159
OHS CV CPX ESTIMATED METS: 12
OHS CV CPX MAX PREDICTED HEART RATE: 187
OHS CV CPX PATIENT IS FEMALE: 0
OHS CV CPX PATIENT IS MALE: 1
OHS CV CPX PEAK DIASTOLIC BLOOD PRESSURE: 82 MMHG
OHS CV CPX PEAK HEAR RATE: 160 BPM
OHS CV CPX PEAK RATE PRESSURE PRODUCT: NORMAL
OHS CV CPX PEAK SYSTOLIC BLOOD PRESSURE: 148 MMHG
OHS CV CPX PERCENT MAX PREDICTED HEART RATE ACHIEVED: 86
OHS CV CPX RATE PRESSURE PRODUCT PRESENTING: 8784
PISA TR MAX VEL: 2.48 M/S
RA PRESSURE ESTIMATED: 3 MMHG
RIGHT VENTRICULAR END-DIASTOLIC DIMENSION: 2.73 CM
RV TB RVSP: 5 MMHG
STRESS ECHO POST EXERCISE DUR MIN: 10 MINUTES
STRESS ECHO POST EXERCISE DUR SEC: 54 SECONDS
SYSTOLIC BLOOD PRESSURE: 122 MMHG
TDI LATERAL: 0.19 M/S
TDI SEPTAL: 0.1 M/S
TDI: 0.15 M/S
TR MAX PG: 25 MMHG
TV REST PULMONARY ARTERY PRESSURE: 28 MMHG
Z-SCORE OF LEFT VENTRICULAR DIMENSION IN END DIASTOLE: -6.21
Z-SCORE OF LEFT VENTRICULAR DIMENSION IN END SYSTOLE: -4.88

## 2023-11-01 ENCOUNTER — OFFICE VISIT (OUTPATIENT)
Dept: FAMILY MEDICINE | Facility: CLINIC | Age: 33
End: 2023-11-01
Payer: COMMERCIAL

## 2023-11-01 VITALS
DIASTOLIC BLOOD PRESSURE: 78 MMHG | SYSTOLIC BLOOD PRESSURE: 130 MMHG | HEART RATE: 85 BPM | OXYGEN SATURATION: 99 % | TEMPERATURE: 98 F | WEIGHT: 242.69 LBS | BODY MASS INDEX: 32.87 KG/M2 | RESPIRATION RATE: 20 BRPM | HEIGHT: 72 IN

## 2023-11-01 DIAGNOSIS — F41.8 MIXED ANXIETY AND DEPRESSIVE DISORDER: ICD-10-CM

## 2023-11-01 DIAGNOSIS — F98.8 ATTENTION DEFICIT DISORDER (ADD) IN ADULT: ICD-10-CM

## 2023-11-01 DIAGNOSIS — F41.8 MIXED ANXIETY AND DEPRESSIVE DISORDER: Primary | ICD-10-CM

## 2023-11-01 PROCEDURE — 1159F PR MEDICATION LIST DOCUMENTED IN MEDICAL RECORD: ICD-10-PCS | Mod: CPTII,S$GLB,, | Performed by: NURSE PRACTITIONER

## 2023-11-01 PROCEDURE — 3078F DIAST BP <80 MM HG: CPT | Mod: CPTII,S$GLB,, | Performed by: NURSE PRACTITIONER

## 2023-11-01 PROCEDURE — 1160F RVW MEDS BY RX/DR IN RCRD: CPT | Mod: CPTII,S$GLB,, | Performed by: NURSE PRACTITIONER

## 2023-11-01 PROCEDURE — 1159F MED LIST DOCD IN RCRD: CPT | Mod: CPTII,S$GLB,, | Performed by: NURSE PRACTITIONER

## 2023-11-01 PROCEDURE — 3008F PR BODY MASS INDEX (BMI) DOCUMENTED: ICD-10-PCS | Mod: CPTII,S$GLB,, | Performed by: NURSE PRACTITIONER

## 2023-11-01 PROCEDURE — 3075F PR MOST RECENT SYSTOLIC BLOOD PRESS GE 130-139MM HG: ICD-10-PCS | Mod: CPTII,S$GLB,, | Performed by: NURSE PRACTITIONER

## 2023-11-01 PROCEDURE — 3008F BODY MASS INDEX DOCD: CPT | Mod: CPTII,S$GLB,, | Performed by: NURSE PRACTITIONER

## 2023-11-01 PROCEDURE — 1160F PR REVIEW ALL MEDS BY PRESCRIBER/CLIN PHARMACIST DOCUMENTED: ICD-10-PCS | Mod: CPTII,S$GLB,, | Performed by: NURSE PRACTITIONER

## 2023-11-01 PROCEDURE — 3078F PR MOST RECENT DIASTOLIC BLOOD PRESSURE < 80 MM HG: ICD-10-PCS | Mod: CPTII,S$GLB,, | Performed by: NURSE PRACTITIONER

## 2023-11-01 PROCEDURE — 3075F SYST BP GE 130 - 139MM HG: CPT | Mod: CPTII,S$GLB,, | Performed by: NURSE PRACTITIONER

## 2023-11-01 PROCEDURE — 99213 OFFICE O/P EST LOW 20 MIN: CPT | Mod: S$GLB,,, | Performed by: NURSE PRACTITIONER

## 2023-11-01 PROCEDURE — 99213 PR OFFICE/OUTPT VISIT, EST, LEVL III, 20-29 MIN: ICD-10-PCS | Mod: S$GLB,,, | Performed by: NURSE PRACTITIONER

## 2023-11-01 RX ORDER — LISDEXAMFETAMINE DIMESYLATE 50 MG/1
50 CAPSULE ORAL EVERY MORNING
Qty: 30 CAPSULE | Refills: 0 | Status: SHIPPED | OUTPATIENT
Start: 2023-11-01 | End: 2023-12-08 | Stop reason: SDUPTHER

## 2023-11-01 NOTE — PROGRESS NOTES
SUBJECTIVE:      Patient ID: Paco Upton is a 33 y.o. male.    Chief Complaint: ADHD (Declines flu vaccine)    Established patient here for follow-up on ADHD, anxiety/depression and medication refills.  He is taking his medications as prescribed daily without side effects or complaints.  He is feeling overall good on this medication regimen and wants to continue at his current doses.  He does try to take breaks/drug holidays from the V[place] on the weekends. LA  reviewed today. Since last visit, he did have a cardiology evaluation completed for reported c/o chest pain and mild SOB with exertion.  Patient had stress test, echo, and EKG-also has labs pending.  Blood pressure is normal on exam today, stress test showed no ischemia.  He denies any pain since his evaluation.  He is feeling tired most days and is unsure if it is due to the decrease in medication due to availability issues on Vyvanse.  He sleeps well nightly, denies any apnea or snoring concerns.  These symptoms started after having his 2nd child and feels it may be related to that.  Lab work is pending and due next month.          History reviewed. No pertinent family history.   Social History     Socioeconomic History    Marital status:    Tobacco Use    Smoking status: Never    Smokeless tobacco: Never   Substance and Sexual Activity    Alcohol use: Yes    Drug use: No    Sexual activity: Yes     Partners: Female     Birth control/protection: OCP     Social Determinants of Health     Financial Resource Strain: Low Risk  (10/31/2023)    Overall Financial Resource Strain (CARDIA)     Difficulty of Paying Living Expenses: Not very hard   Food Insecurity: No Food Insecurity (10/31/2023)    Hunger Vital Sign     Worried About Running Out of Food in the Last Year: Never true     Ran Out of Food in the Last Year: Never true   Transportation Needs: No Transportation Needs (10/31/2023)    PRAPARE - Transportation     Lack of Transportation  (Medical): No     Lack of Transportation (Non-Medical): No   Physical Activity: Sufficiently Active (10/31/2023)    Exercise Vital Sign     Days of Exercise per Week: 5 days     Minutes of Exercise per Session: 60 min   Stress: Stress Concern Present (10/31/2023)    [employer] [place] [employer] - Occupational Stress Questionnaire     Feeling of Stress : Very much   Social Connections: Unknown (10/31/2023)    Social Connection and Isolation Panel [NHANES]     Frequency of Communication with Friends and Family: More than three times a week     Frequency of Social Gatherings with Friends and Family: Once a week     Active Member of Clubs or Organizations: No     Attends Club or Organization Meetings: Never     Marital Status:    Housing Stability: Low Risk  (10/31/2023)    Housing Stability Vital Sign     Unable to Pay for Housing in the Last Year: No     Number of Places Lived in the Last Year: 1     Unstable Housing in the Last Year: No     Current Outpatient Medications   Medication Sig Dispense Refill    buPROPion (WELLBUTRIN XL) 150 MG TB24 tablet Take 1 tablet (150 mg total) by mouth once daily. 90 tablet 1    busPIRone (BUSPAR) 10 MG tablet Take 1 tablet (10 mg total) by mouth 2 (two) times daily. 180 tablet 1    cetirizine (ZYRTEC) 10 mg Cap       lisdexamfetamine (VYVANSE) 50 MG capsule Take 1 capsule (50 mg total) by mouth every morning. 30 capsule 0     No current facility-administered medications for this visit.     Review of patient's allergies indicates:   Allergen Reactions    Sulfa (sulfonamide antibiotics) Anaphylaxis      Past Medical History:   Diagnosis Date    ADD (attention deficit disorder)     Stomach problems     delayed empting     History reviewed. No pertinent surgical history.    Review of Systems   Constitutional:  Positive for fatigue. Negative for activity change, appetite change, chills, diaphoresis, fever and unexpected weight change.   HENT:  Negative for  congestion, hearing loss, rhinorrhea and trouble swallowing.    Eyes:  Negative for pain, discharge and visual disturbance.   Respiratory:  Negative for apnea, cough, chest tightness, shortness of breath and wheezing.    Cardiovascular:  Negative for chest pain, palpitations and leg swelling.   Gastrointestinal:  Negative for abdominal pain, blood in stool, constipation, diarrhea, nausea and vomiting.   Endocrine: Negative for cold intolerance, heat intolerance, polydipsia and polyuria.   Genitourinary:  Negative for difficulty urinating, dysuria, flank pain, frequency, hematuria and urgency.   Musculoskeletal:  Negative for arthralgias, back pain, joint swelling and neck pain.   Neurological:  Positive for headaches. Negative for dizziness, weakness and numbness.   Hematological:  Negative for adenopathy. Does not bruise/bleed easily.   Psychiatric/Behavioral:  Positive for decreased concentration. Negative for confusion, dysphoric mood, sleep disturbance and suicidal ideas. The patient is nervous/anxious.       OBJECTIVE:      Vitals:    11/01/23 0742   BP: 130/78   BP Location: Left arm   Patient Position: Sitting   BP Method: Large (Manual)   Pulse: 85   Resp: 20   Temp: 98 °F (36.7 °C)   TempSrc: Oral   SpO2: 99%   Weight: 110.1 kg (242 lb 11.2 oz)   Height: 6' (1.829 m)     Physical Exam  Vitals and nursing note reviewed.   Constitutional:       General: He is not in acute distress.     Appearance: Normal appearance. He is well-developed. He is obese. He is not ill-appearing or diaphoretic.   HENT:      Head: Normocephalic.      Mouth/Throat:      Mouth: Mucous membranes are moist.      Pharynx: Uvula midline.   Eyes:      General: No scleral icterus.     Conjunctiva/sclera: Conjunctivae normal.      Pupils: Pupils are equal, round, and reactive to light.   Cardiovascular:      Rate and Rhythm: Normal rate and regular rhythm.      Heart sounds: Normal heart sounds. No murmur heard.  Pulmonary:      Effort:  Pulmonary effort is normal. No respiratory distress.      Breath sounds: Normal breath sounds. No wheezing, rhonchi or rales.   Musculoskeletal:         General: Normal range of motion.      Cervical back: Normal range of motion and neck supple.      Right lower leg: No edema.      Left lower leg: No edema.   Lymphadenopathy:      Cervical: No cervical adenopathy.   Skin:     General: Skin is warm and dry.      Coloration: Skin is not jaundiced or pale.   Neurological:      Mental Status: He is alert and oriented to person, place, and time.   Psychiatric:         Attention and Perception: Attention normal.         Mood and Affect: Mood and affect normal.         Speech: Speech normal.         Behavior: Behavior normal.         Thought Content: Thought content normal.         Judgment: Judgment normal.        Assessment:       1. Mixed anxiety and depressive disorder    2. Attention deficit disorder (ADD) in adult        Plan:       Mixed anxiety and depressive disorder   -stable, may need increase to wellbutrin 300 mg if needed due to stress issues art work; continue for now; get labs to r/o other causes of fatigue     Attention deficit disorder (ADD) in adult  -     lisdexamfetamine (VYVANSE) 50 MG capsule; Take 1 capsule (50 mg total) by mouth every morning.  Dispense: 30 capsule; Refill: 0   -cont at current dose; LA  reviewed; cards eval done- reviewed; no further CP or SOB, BP normal; encouraged drug holidays and breaks to prevent tolerance and dependence     I spent a total of 20 minutes on the day of the visit.This includes face to face time and non-face to face time preparing to see the patient (eg, review of tests), obtaining and/or reviewing separately obtained history, documenting clinical information in the electronic or other health record, independently interpreting results and communicating results to the patient/family/caregiver, or care coordinator.       Follow up in about 3 months (around  2/1/2024) for annual .      11/1/2023 JESSICA Parr, FNP    This note was created using [employer] voice recognition software that occasionally misinterprets phrases or words.

## 2023-11-02 RX ORDER — BUPROPION HYDROCHLORIDE 150 MG/1
150 TABLET ORAL DAILY
Qty: 90 TABLET | Refills: 1 | Status: SHIPPED | OUTPATIENT
Start: 2023-11-02

## 2023-11-13 DIAGNOSIS — F41.8 MIXED ANXIETY AND DEPRESSIVE DISORDER: ICD-10-CM

## 2023-11-13 RX ORDER — BUSPIRONE HYDROCHLORIDE 10 MG/1
10 TABLET ORAL 2 TIMES DAILY
Qty: 180 TABLET | Refills: 1 | Status: SHIPPED | OUTPATIENT
Start: 2023-11-13

## 2023-12-08 DIAGNOSIS — F98.8 ATTENTION DEFICIT DISORDER (ADD) IN ADULT: ICD-10-CM

## 2023-12-11 RX ORDER — LISDEXAMFETAMINE DIMESYLATE 50 MG/1
50 CAPSULE ORAL EVERY MORNING
Qty: 30 CAPSULE | Refills: 0 | Status: SHIPPED | OUTPATIENT
Start: 2023-12-11 | End: 2024-01-09 | Stop reason: SDUPTHER

## 2024-01-09 DIAGNOSIS — F98.8 ATTENTION DEFICIT DISORDER (ADD) IN ADULT: ICD-10-CM

## 2024-01-11 RX ORDER — LISDEXAMFETAMINE DIMESYLATE CAPSULES 50 MG/1
50 CAPSULE ORAL EVERY MORNING
Qty: 30 CAPSULE | Refills: 0 | Status: SHIPPED | OUTPATIENT
Start: 2024-01-11 | End: 2024-02-10 | Stop reason: SDUPTHER

## 2024-01-12 RX ORDER — AZITHROMYCIN 250 MG/1
TABLET, FILM COATED ORAL
Qty: 6 EACH | Refills: 0 | Status: SHIPPED | OUTPATIENT
Start: 2024-01-12 | End: 2024-01-27

## 2024-02-10 DIAGNOSIS — F98.8 ATTENTION DEFICIT DISORDER (ADD) IN ADULT: ICD-10-CM

## 2024-02-12 RX ORDER — LISDEXAMFETAMINE DIMESYLATE 50 MG/1
50 CAPSULE ORAL EVERY MORNING
Qty: 30 CAPSULE | Refills: 0 | Status: SHIPPED | OUTPATIENT
Start: 2024-02-12 | End: 2024-03-12 | Stop reason: SDUPTHER

## 2024-03-11 ENCOUNTER — TELEPHONE (OUTPATIENT)
Dept: FAMILY MEDICINE | Facility: CLINIC | Age: 34
End: 2024-03-11
Payer: COMMERCIAL

## 2024-03-12 ENCOUNTER — OFFICE VISIT (OUTPATIENT)
Dept: FAMILY MEDICINE | Facility: CLINIC | Age: 34
End: 2024-03-12
Payer: COMMERCIAL

## 2024-03-12 VITALS
TEMPERATURE: 98 F | BODY MASS INDEX: 31.99 KG/M2 | HEART RATE: 108 BPM | WEIGHT: 236.19 LBS | DIASTOLIC BLOOD PRESSURE: 81 MMHG | HEIGHT: 72 IN | OXYGEN SATURATION: 98 % | SYSTOLIC BLOOD PRESSURE: 127 MMHG

## 2024-03-12 DIAGNOSIS — F98.8 ATTENTION DEFICIT DISORDER (ADD) IN ADULT: ICD-10-CM

## 2024-03-12 PROCEDURE — 1159F MED LIST DOCD IN RCRD: CPT | Mod: CPTII,S$GLB,, | Performed by: NURSE PRACTITIONER

## 2024-03-12 PROCEDURE — 1160F RVW MEDS BY RX/DR IN RCRD: CPT | Mod: CPTII,S$GLB,, | Performed by: NURSE PRACTITIONER

## 2024-03-12 PROCEDURE — 3079F DIAST BP 80-89 MM HG: CPT | Mod: CPTII,S$GLB,, | Performed by: NURSE PRACTITIONER

## 2024-03-12 PROCEDURE — 3074F SYST BP LT 130 MM HG: CPT | Mod: CPTII,S$GLB,, | Performed by: NURSE PRACTITIONER

## 2024-03-12 PROCEDURE — 3008F BODY MASS INDEX DOCD: CPT | Mod: CPTII,S$GLB,, | Performed by: NURSE PRACTITIONER

## 2024-03-12 PROCEDURE — 99213 OFFICE O/P EST LOW 20 MIN: CPT | Mod: S$GLB,,, | Performed by: NURSE PRACTITIONER

## 2024-03-12 PROCEDURE — 99999 PR PBB SHADOW E&M-EST. PATIENT-LVL IV: CPT | Mod: PBBFAC,,, | Performed by: NURSE PRACTITIONER

## 2024-03-12 RX ORDER — LISDEXAMFETAMINE DIMESYLATE 50 MG/1
50 CAPSULE ORAL EVERY MORNING
Qty: 30 CAPSULE | Refills: 0 | Status: SHIPPED | OUTPATIENT
Start: 2024-03-12 | End: 2024-04-08 | Stop reason: SDUPTHER

## 2024-03-12 NOTE — PROGRESS NOTES
SUBJECTIVE:      Patient ID: Paco Upton is a 33 y.o. male.    Chief Complaint: Medication Refill    Established patient here for follow-up on ADHD and medication refills.  He is taking his medications as prescribed daily without side effects or complaints.  He is feeling overall good on this medication regimen.  He does try to take breaks/drug holidays from the [employer] on the weekends. LA  reviewed today. Blood pressure is normal on exam today, previous stress test showed no ischemia.  He started working out regularly and taking better care of himself since last visit, down 8 lb. Lab work is pending.         History reviewed. No pertinent family history.   Social History     Socioeconomic History    Marital status:    Tobacco Use    Smoking status: Never    Smokeless tobacco: Never   Substance and Sexual Activity    Alcohol use: Yes    Drug use: No    Sexual activity: Yes     Partners: Female     Birth control/protection: OCP     Social Determinants of Health     Financial Resource Strain: Low Risk  (3/11/2024)    Overall Financial Resource Strain (CARDIA)     Difficulty of Paying Living Expenses: Not very hard   Food Insecurity: No Food Insecurity (3/11/2024)    Hunger Vital Sign     Worried About Running Out of Food in the Last Year: Never true     Ran Out of Food in the Last Year: Never true   Transportation Needs: No Transportation Needs (3/11/2024)    PRAPARE - Transportation     Lack of Transportation (Medical): No     Lack of Transportation (Non-Medical): No   Physical Activity: Sufficiently Active (3/11/2024)    Exercise Vital Sign     Days of Exercise per Week: 5 days     Minutes of Exercise per Session: 120 min   Stress: Stress Concern Present (3/11/2024)    [employer] [place] [employer] - Occupational Stress Questionnaire     Feeling of Stress : To some extent   Social Connections: Unknown (3/11/2024)    Social Connection and Isolation Panel [NHANES]     Frequency of  Communication with Friends and Family: More than three times a week     Frequency of Social Gatherings with Friends and Family: More than three times a week     Active Member of Clubs or Organizations: No     Attends Club or Organization Meetings: Never     Marital Status:    Housing Stability: Low Risk  (3/11/2024)    Housing Stability Vital Sign     Unable to Pay for Housing in the Last Year: No     Number of Places Lived in the Last Year: 1     Unstable Housing in the Last Year: No     Current Outpatient Medications   Medication Sig Dispense Refill    buPROPion (WELLBUTRIN XL) 150 MG TB24 tablet Take 1 tablet (150 mg total) by mouth once daily. 90 tablet 1    busPIRone (BUSPAR) 10 MG tablet Take 1 tablet (10 mg total) by mouth 2 (two) times daily. 180 tablet 1    cetirizine (ZYRTEC) 10 mg Cap       lisdexamfetamine (VYVANSE) 50 MG capsule Take 1 capsule (50 mg total) by mouth every morning. 30 capsule 0     No current facility-administered medications for this visit.     Review of patient's allergies indicates:   Allergen Reactions    Sulfa (sulfonamide antibiotics) Anaphylaxis      Past Medical History:   Diagnosis Date    ADD (attention deficit disorder)     Stomach problems     delayed empting     History reviewed. No pertinent surgical history.    Review of Systems   Constitutional:  Negative for activity change, appetite change, chills, diaphoresis, fatigue, fever and unexpected weight change.   HENT:  Negative for congestion, hearing loss, rhinorrhea and trouble swallowing.    Eyes:  Negative for pain, discharge and visual disturbance.   Respiratory:  Negative for apnea, cough, chest tightness, shortness of breath and wheezing.    Cardiovascular:  Negative for chest pain, palpitations and leg swelling.   Gastrointestinal:  Negative for abdominal pain, blood in stool, constipation, diarrhea, nausea and vomiting.   Endocrine: Negative for cold intolerance, heat intolerance, polydipsia and polyuria.    Genitourinary:  Negative for difficulty urinating, dysuria, flank pain, frequency, hematuria and urgency.   Musculoskeletal:  Negative for arthralgias, back pain, joint swelling and neck pain.   Neurological:  Positive for headaches. Negative for dizziness, weakness and numbness.   Hematological:  Negative for adenopathy. Does not bruise/bleed easily.   Psychiatric/Behavioral:  Positive for decreased concentration. Negative for confusion, dysphoric mood, sleep disturbance and suicidal ideas. The patient is nervous/anxious.       OBJECTIVE:      Vitals:    03/12/24 1442   BP: 127/81   BP Location: Right arm   Patient Position: Sitting   BP Method: Large (Automatic)   Pulse: 108   Temp: 98.4 °F (36.9 °C)   TempSrc: Oral   SpO2: 98%   Weight: 107.1 kg (236 lb 3.2 oz)   Height: 6' (1.829 m)     Physical Exam  Vitals and nursing note reviewed.   Constitutional:       General: He is not in acute distress.     Appearance: Normal appearance. He is well-developed. He is obese. He is not ill-appearing or diaphoretic.   HENT:      Head: Normocephalic.      Mouth/Throat:      Mouth: Mucous membranes are moist.      Pharynx: Uvula midline.   Eyes:      General: No scleral icterus.     Pupils: Pupils are equal, round, and reactive to light.   Cardiovascular:      Rate and Rhythm: Normal rate and regular rhythm.      Heart sounds: Normal heart sounds. No murmur heard.     Comments: Apical rate 90  Pulmonary:      Effort: Pulmonary effort is normal. No respiratory distress.      Breath sounds: Normal breath sounds. No wheezing, rhonchi or rales.   Musculoskeletal:         General: Normal range of motion.      Cervical back: Normal range of motion and neck supple.      Right lower leg: No edema.      Left lower leg: No edema.   Lymphadenopathy:      Cervical: No cervical adenopathy.   Skin:     General: Skin is warm and dry.      Coloration: Skin is not jaundiced or pale.   Neurological:      Mental Status: He is alert and  oriented to person, place, and time.   Psychiatric:         Attention and Perception: Attention normal.         Mood and Affect: Mood and affect normal.         Speech: Speech normal.         Behavior: Behavior normal.         Thought Content: Thought content normal.         Judgment: Judgment normal.        Assessment:       1. Attention deficit disorder (ADD) in adult        Plan:       Attention deficit disorder (ADD) in adult  -     lisdexamfetamine (VYVANSE) 50 MG capsule; Take 1 capsule (50 mg total) by mouth every morning.  Dispense: 30 capsule; Refill: 0   -continue Vyvanse at current dose, LA  reviewed and no concerns; continue to take drug holidays on weekends and breaks from work      Follow up in about 3 months (around 6/12/2024) for Annual.      3/12/2024 JESSICA Parr, FARZANAP    This note was created using [employer] voice recognition software that occasionally misinterprets phrases or words.

## 2024-04-08 DIAGNOSIS — F98.8 ATTENTION DEFICIT DISORDER (ADD) IN ADULT: ICD-10-CM

## 2024-04-09 RX ORDER — LISDEXAMFETAMINE DIMESYLATE 50 MG/1
50 CAPSULE ORAL EVERY MORNING
Qty: 30 CAPSULE | Refills: 0 | Status: SHIPPED | OUTPATIENT
Start: 2024-04-09 | End: 2024-05-13 | Stop reason: SDUPTHER

## 2024-05-09 DIAGNOSIS — F41.8 MIXED ANXIETY AND DEPRESSIVE DISORDER: ICD-10-CM

## 2024-05-10 RX ORDER — BUSPIRONE HYDROCHLORIDE 10 MG/1
10 TABLET ORAL 2 TIMES DAILY
Qty: 180 TABLET | Refills: 0 | Status: SHIPPED | OUTPATIENT
Start: 2024-05-10

## 2024-05-10 RX ORDER — BUPROPION HYDROCHLORIDE 150 MG/1
150 TABLET ORAL DAILY
Qty: 90 TABLET | Refills: 0 | Status: SHIPPED | OUTPATIENT
Start: 2024-05-10

## 2024-05-13 DIAGNOSIS — F98.8 ATTENTION DEFICIT DISORDER (ADD) IN ADULT: ICD-10-CM

## 2024-05-14 RX ORDER — LISDEXAMFETAMINE DIMESYLATE 50 MG/1
50 CAPSULE ORAL EVERY MORNING
Qty: 30 CAPSULE | Refills: 0 | Status: SHIPPED | OUTPATIENT
Start: 2024-05-14 | End: 2024-06-11 | Stop reason: SDUPTHER

## 2024-06-11 DIAGNOSIS — F98.8 ATTENTION DEFICIT DISORDER (ADD) IN ADULT: ICD-10-CM

## 2024-06-12 RX ORDER — LISDEXAMFETAMINE DIMESYLATE 50 MG/1
50 CAPSULE ORAL EVERY MORNING
Qty: 30 CAPSULE | Refills: 0 | Status: SHIPPED | OUTPATIENT
Start: 2024-06-12

## 2024-06-19 PROBLEM — E78.49 OTHER HYPERLIPIDEMIA: Status: ACTIVE | Noted: 2024-06-19

## 2024-06-19 PROBLEM — E78.00 HYPERCHOLESTEROLEMIA: Status: RESOLVED | Noted: 2023-10-06 | Resolved: 2024-06-19

## 2024-07-15 ENCOUNTER — TELEPHONE (OUTPATIENT)
Dept: FAMILY MEDICINE | Facility: CLINIC | Age: 34
End: 2024-07-15
Payer: COMMERCIAL

## 2024-07-16 ENCOUNTER — TELEPHONE (OUTPATIENT)
Dept: FAMILY MEDICINE | Facility: CLINIC | Age: 34
End: 2024-07-16

## 2024-07-16 ENCOUNTER — LAB VISIT (OUTPATIENT)
Dept: LAB | Facility: HOSPITAL | Age: 34
End: 2024-07-16
Attending: NURSE PRACTITIONER
Payer: COMMERCIAL

## 2024-07-16 ENCOUNTER — OFFICE VISIT (OUTPATIENT)
Dept: FAMILY MEDICINE | Facility: CLINIC | Age: 34
End: 2024-07-16
Payer: COMMERCIAL

## 2024-07-16 VITALS
HEIGHT: 72 IN | RESPIRATION RATE: 16 BRPM | OXYGEN SATURATION: 98 % | SYSTOLIC BLOOD PRESSURE: 128 MMHG | BODY MASS INDEX: 32.55 KG/M2 | TEMPERATURE: 98 F | DIASTOLIC BLOOD PRESSURE: 82 MMHG | WEIGHT: 240.31 LBS | HEART RATE: 88 BPM

## 2024-07-16 DIAGNOSIS — F98.8 ATTENTION DEFICIT DISORDER (ADD) IN ADULT: ICD-10-CM

## 2024-07-16 DIAGNOSIS — G47.19 EXCESSIVE DAYTIME SLEEPINESS: ICD-10-CM

## 2024-07-16 DIAGNOSIS — R14.0 ABDOMINAL BLOATING: ICD-10-CM

## 2024-07-16 DIAGNOSIS — R06.83 LOUD SNORING: ICD-10-CM

## 2024-07-16 DIAGNOSIS — Z13.1 DIABETES MELLITUS SCREENING: ICD-10-CM

## 2024-07-16 DIAGNOSIS — Z00.00 PREVENTATIVE HEALTH CARE: ICD-10-CM

## 2024-07-16 DIAGNOSIS — E66.09 CLASS 1 OBESITY DUE TO EXCESS CALORIES WITHOUT SERIOUS COMORBIDITY WITH BODY MASS INDEX (BMI) OF 32.0 TO 32.9 IN ADULT: ICD-10-CM

## 2024-07-16 DIAGNOSIS — F98.8 ATTENTION DEFICIT DISORDER (ADD) IN ADULT: Primary | ICD-10-CM

## 2024-07-16 LAB
ALBUMIN SERPL BCP-MCNC: 4.8 G/DL (ref 3.5–5.2)
ALP SERPL-CCNC: 80 U/L (ref 55–135)
ALT SERPL W/O P-5'-P-CCNC: 61 U/L (ref 10–44)
ANION GAP SERPL CALC-SCNC: 6 MMOL/L (ref 8–16)
AST SERPL-CCNC: 31 U/L (ref 10–40)
BASOPHILS # BLD AUTO: 0.03 K/UL (ref 0–0.2)
BASOPHILS NFR BLD: 0.4 % (ref 0–1.9)
BILIRUB SERPL-MCNC: 0.5 MG/DL (ref 0.1–1)
BUN SERPL-MCNC: 19 MG/DL (ref 6–20)
CALCIUM SERPL-MCNC: 9.5 MG/DL (ref 8.7–10.5)
CHLORIDE SERPL-SCNC: 106 MMOL/L (ref 95–110)
CHOLEST SERPL-MCNC: 194 MG/DL (ref 120–199)
CHOLEST/HDLC SERPL: 5 {RATIO} (ref 2–5)
CO2 SERPL-SCNC: 29 MMOL/L (ref 23–29)
CREAT SERPL-MCNC: 1 MG/DL (ref 0.5–1.4)
DIFFERENTIAL METHOD BLD: ABNORMAL
EOSINOPHIL # BLD AUTO: 0 K/UL (ref 0–0.5)
EOSINOPHIL NFR BLD: 0.5 % (ref 0–8)
ERYTHROCYTE [DISTWIDTH] IN BLOOD BY AUTOMATED COUNT: 12.6 % (ref 11.5–14.5)
EST. GFR  (NO RACE VARIABLE): >60 ML/MIN/1.73 M^2
GLUCOSE SERPL-MCNC: 46 MG/DL (ref 70–110)
HCT VFR BLD AUTO: 40.2 % (ref 40–54)
HDLC SERPL-MCNC: 39 MG/DL (ref 40–75)
HDLC SERPL: 20.1 % (ref 20–50)
HGB BLD-MCNC: 13.7 G/DL (ref 14–18)
IMM GRANULOCYTES # BLD AUTO: 0.03 K/UL (ref 0–0.04)
IMM GRANULOCYTES NFR BLD AUTO: 0.4 % (ref 0–0.5)
LDLC SERPL CALC-MCNC: 126.6 MG/DL (ref 63–159)
LYMPHOCYTES # BLD AUTO: 2.4 K/UL (ref 1–4.8)
LYMPHOCYTES NFR BLD: 31.7 % (ref 18–48)
MCH RBC QN AUTO: 30.1 PG (ref 27–31)
MCHC RBC AUTO-ENTMCNC: 34.1 G/DL (ref 32–36)
MCV RBC AUTO: 88 FL (ref 82–98)
MONOCYTES # BLD AUTO: 0.6 K/UL (ref 0.3–1)
MONOCYTES NFR BLD: 7.4 % (ref 4–15)
NEUTROPHILS # BLD AUTO: 4.4 K/UL (ref 1.8–7.7)
NEUTROPHILS NFR BLD: 59.6 % (ref 38–73)
NONHDLC SERPL-MCNC: 155 MG/DL
NRBC BLD-RTO: 0 /100 WBC
PLATELET # BLD AUTO: 240 K/UL (ref 150–450)
PMV BLD AUTO: 9.3 FL (ref 9.2–12.9)
POTASSIUM SERPL-SCNC: 3.7 MMOL/L (ref 3.5–5.1)
PROT SERPL-MCNC: 7.6 G/DL (ref 6–8.4)
RBC # BLD AUTO: 4.55 M/UL (ref 4.6–6.2)
SODIUM SERPL-SCNC: 141 MMOL/L (ref 136–145)
TRIGL SERPL-MCNC: 142 MG/DL (ref 30–150)
TSH SERPL DL<=0.005 MIU/L-ACNC: 1.57 UIU/ML (ref 0.34–5.6)
WBC # BLD AUTO: 7.41 K/UL (ref 3.9–12.7)

## 2024-07-16 PROCEDURE — 99214 OFFICE O/P EST MOD 30 MIN: CPT | Mod: S$GLB,,, | Performed by: NURSE PRACTITIONER

## 2024-07-16 PROCEDURE — 84443 ASSAY THYROID STIM HORMONE: CPT | Performed by: NURSE PRACTITIONER

## 2024-07-16 PROCEDURE — 85025 COMPLETE CBC W/AUTO DIFF WBC: CPT | Performed by: NURSE PRACTITIONER

## 2024-07-16 PROCEDURE — 3079F DIAST BP 80-89 MM HG: CPT | Mod: CPTII,S$GLB,, | Performed by: NURSE PRACTITIONER

## 2024-07-16 PROCEDURE — 80061 LIPID PANEL: CPT | Performed by: NURSE PRACTITIONER

## 2024-07-16 PROCEDURE — 99999 PR PBB SHADOW E&M-EST. PATIENT-LVL IV: CPT | Mod: PBBFAC,,, | Performed by: NURSE PRACTITIONER

## 2024-07-16 PROCEDURE — 3074F SYST BP LT 130 MM HG: CPT | Mod: CPTII,S$GLB,, | Performed by: NURSE PRACTITIONER

## 2024-07-16 PROCEDURE — 80053 COMPREHEN METABOLIC PANEL: CPT | Performed by: NURSE PRACTITIONER

## 2024-07-16 PROCEDURE — 1159F MED LIST DOCD IN RCRD: CPT | Mod: CPTII,S$GLB,, | Performed by: NURSE PRACTITIONER

## 2024-07-16 PROCEDURE — 36415 COLL VENOUS BLD VENIPUNCTURE: CPT | Performed by: NURSE PRACTITIONER

## 2024-07-16 PROCEDURE — 83036 HEMOGLOBIN GLYCOSYLATED A1C: CPT | Performed by: NURSE PRACTITIONER

## 2024-07-16 PROCEDURE — 3008F BODY MASS INDEX DOCD: CPT | Mod: CPTII,S$GLB,, | Performed by: NURSE PRACTITIONER

## 2024-07-16 RX ORDER — LISDEXAMFETAMINE DIMESYLATE 50 MG/1
50 CAPSULE ORAL EVERY MORNING
Qty: 30 CAPSULE | Refills: 0 | Status: SHIPPED | OUTPATIENT
Start: 2024-07-16

## 2024-07-16 NOTE — PROGRESS NOTES
SUBJECTIVE:      Patient ID: Paco Upton is a 33 y.o. male.    Chief Complaint: ADD    Established patient here for follow-up on ADHD and medication refills.  He is taking his medications as prescribed daily without side effects or complaints.  He is feeling overall good on this medication regimen.  He does try to take breaks/drug holidays from the [employer] on the weekends. LA  reviewed today. Blood pressure is normal on exam today, previous stress test showed no ischemia.  Weight is up since last visit- lab results pending. Pt c/o feeling tired excessively through the day and could nap most days. Does not wake up refreshed a lot. Wife reports snoring and he has been waking up a lot, usually 6 or 7 times nightly when he does wake up.      Pt is also c/o abd bloating, easy fullness for some time now. Denies abd pain, n/v/d or fever/chills. Happened after eating a hot dog recently. Has not been watching his diet.         No family history on file.   Social History     Socioeconomic History    Marital status:    Tobacco Use    Smoking status: Never    Smokeless tobacco: Never   Substance and Sexual Activity    Alcohol use: Yes    Drug use: No    Sexual activity: Yes     Partners: Female     Birth control/protection: OCP     Social Determinants of Health     Financial Resource Strain: Low Risk  (3/11/2024)    Overall Financial Resource Strain (CARDIA)     Difficulty of Paying Living Expenses: Not very hard   Food Insecurity: No Food Insecurity (3/11/2024)    Hunger Vital Sign     Worried About Running Out of Food in the Last Year: Never true     Ran Out of Food in the Last Year: Never true   Transportation Needs: No Transportation Needs (3/11/2024)    PRAPARE - Transportation     Lack of Transportation (Medical): No     Lack of Transportation (Non-Medical): No   Physical Activity: Sufficiently Active (3/11/2024)    Exercise Vital Sign     Days of Exercise per Week: 5 days     Minutes of Exercise per  Session: 120 min   Stress: Stress Concern Present (3/11/2024)    [employer] [employer] - Occupational Stress Questionnaire     Feeling of Stress : To some extent   Housing Stability: Low Risk  (3/11/2024)    Housing Stability Vital Sign     Unable to Pay for Housing in the Last Year: No     Number of Places Lived in the Last Year: 1     Unstable Housing in the Last Year: No     Current Outpatient Medications   Medication Sig Dispense Refill    buPROPion (WELLBUTRIN XL) 150 MG TB24 tablet Take 1 tablet (150 mg total) by mouth once daily. 90 tablet 0    busPIRone (BUSPAR) 10 MG tablet Take 1 tablet (10 mg total) by mouth 2 (two) times daily. 180 tablet 0    cetirizine (ZYRTEC) 10 mg Cap       lisdexamfetamine (VYVANSE) 50 MG capsule Take 1 capsule (50 mg total) by mouth every morning. 30 capsule 0     No current facility-administered medications for this visit.     Review of patient's allergies indicates:   Allergen Reactions    Sulfa (sulfonamide antibiotics) Anaphylaxis      Past Medical History:   Diagnosis Date    ADD (attention deficit disorder)     Stomach problems     delayed empting     History reviewed. No pertinent surgical history.    Review of Systems   Constitutional:  Negative for activity change, appetite change, chills, diaphoresis, fatigue, fever and unexpected weight change.   HENT:  Negative for congestion, hearing loss, rhinorrhea and trouble swallowing.         +loud snoring   Eyes:  Negative for pain, discharge and visual disturbance.   Respiratory:  Negative for cough, chest tightness, shortness of breath and wheezing.    Cardiovascular:  Negative for chest pain, palpitations and leg swelling.   Gastrointestinal:  Positive for abdominal distention and abdominal pain. Negative for blood in stool, constipation, diarrhea, nausea and vomiting.   Endocrine: Negative for cold intolerance, heat intolerance, polydipsia and polyuria.   Genitourinary:  Negative for difficulty urinating,  dysuria, flank pain, frequency, hematuria and urgency.   Musculoskeletal:  Negative for arthralgias, back pain, joint swelling and neck pain.   Neurological:  Negative for dizziness, weakness, numbness and headaches.   Hematological:  Negative for adenopathy. Does not bruise/bleed easily.   Psychiatric/Behavioral:  Positive for decreased concentration and sleep disturbance. Negative for confusion, dysphoric mood and suicidal ideas. The patient is nervous/anxious.       OBJECTIVE:      Vitals:    07/16/24 1356   BP: 128/82   BP Location: Left arm   Patient Position: Sitting   BP Method: Large (Manual)   Pulse: 88   Resp: 16   Temp: 98.4 °F (36.9 °C)   TempSrc: Oral   SpO2: 98%   Weight: 109 kg (240 lb 4.8 oz)   Height: 6' (1.829 m)     Physical Exam  Vitals and nursing note reviewed.   Constitutional:       General: He is not in acute distress.     Appearance: Normal appearance. He is well-developed. He is obese. He is not ill-appearing or diaphoretic.   HENT:      Head: Normocephalic.      Nose: Nose normal.      Mouth/Throat:      Mouth: Mucous membranes are moist.      Pharynx: Oropharynx is clear. Uvula midline.   Eyes:      General: No scleral icterus.     Pupils: Pupils are equal, round, and reactive to light.   Cardiovascular:      Rate and Rhythm: Normal rate and regular rhythm.      Heart sounds: Normal heart sounds. No murmur heard.  Pulmonary:      Effort: Pulmonary effort is normal. No respiratory distress.      Breath sounds: Normal breath sounds. No wheezing, rhonchi or rales.   Abdominal:      General: Bowel sounds are normal.      Palpations: Abdomen is soft.      Tenderness: There is no abdominal tenderness.   Musculoskeletal:         General: Normal range of motion.      Cervical back: Normal range of motion and neck supple.      Right lower leg: No edema.      Left lower leg: No edema.   Lymphadenopathy:      Cervical: No cervical adenopathy.   Skin:     General: Skin is warm and dry.       Coloration: Skin is not jaundiced or pale.   Neurological:      Mental Status: He is alert and oriented to person, place, and time.   Psychiatric:         Attention and Perception: Attention normal.         Mood and Affect: Mood and affect normal.         Speech: Speech normal.         Behavior: Behavior normal.         Thought Content: Thought content normal.         Judgment: Judgment normal.        Assessment:       1. Attention deficit disorder (ADD) in adult    2. Abdominal bloating    3. Loud snoring    4. Excessive daytime sleepiness    5. Class 1 obesity due to excess calories without serious comorbidity with body mass index (BMI) of 32.0 to 32.9 in adult        Plan:       Attention deficit disorder (ADD) in adult  -     lisdexamfetamine (VYVANSE) 50 MG capsule; Take 1 capsule (50 mg total) by mouth every morning.  Dispense: 30 capsule; Refill: 0  - continue Vyvanse at current dose, LA  reviewed and no concerns; continue to take drug holidays on weekends and breaks from work     Abdominal bloating  -     US Abdomen Limited; Future; Expected date: 07/16/2024   -r/o gallstones; limit fatty foods/milk/dairy; eat  diet less in processed foods/fatty meats; GI referral if negative    Loud snoring  -     Home Sleep Study; Future   R/o PATIENCE     Excessive daytime sleepiness  -     Home Sleep Study; Future    Class 1 obesity due to excess calories without serious comorbidity with body mass index (BMI) of 32.0 to 32.9 in adult  -     Home Sleep Study; Future        Follow up in about 3 months (around 10/16/2024) for annual .      7/16/2024 Rubi Degroot, JESSICA, FNP    This note was created using [employer] voice recognition software that occasionally misinterprets phrases or words.

## 2024-07-16 NOTE — TELEPHONE ENCOUNTER
Called received from [facility] chemistry lab (Evelyn) to report patient critical glucose 46 drawn at 1:38 pm. Patient just seen in clinic and said he had fasted all day so he could get his labs done right before his appt. VISHAL Degroot notified. Will attempt to contact patient again.

## 2024-07-17 LAB
ESTIMATED AVG GLUCOSE: 105 MG/DL (ref 68–131)
HBA1C MFR BLD: 5.3 % (ref 4.5–6.2)

## 2024-07-17 NOTE — PROGRESS NOTES
No diabetes or prediabetes; glucose was low and already discussed; do not skip meals, eat more protein and more complex carbs/fiber; cholesterol a little elevated but no medicine is needed at this time

## 2024-08-05 DIAGNOSIS — F41.8 MIXED ANXIETY AND DEPRESSIVE DISORDER: ICD-10-CM

## 2024-08-05 RX ORDER — BUSPIRONE HYDROCHLORIDE 10 MG/1
10 TABLET ORAL 2 TIMES DAILY
Qty: 180 TABLET | Refills: 1 | Status: SHIPPED | OUTPATIENT
Start: 2024-08-05

## 2024-08-05 RX ORDER — BUPROPION HYDROCHLORIDE 150 MG/1
150 TABLET ORAL DAILY
Qty: 90 TABLET | Refills: 1 | Status: SHIPPED | OUTPATIENT
Start: 2024-08-05

## 2024-08-15 DIAGNOSIS — F98.8 ATTENTION DEFICIT DISORDER (ADD) IN ADULT: ICD-10-CM

## 2024-08-16 ENCOUNTER — PATIENT MESSAGE (OUTPATIENT)
Dept: FAMILY MEDICINE | Facility: CLINIC | Age: 34
End: 2024-08-16
Payer: COMMERCIAL

## 2024-08-16 RX ORDER — LISDEXAMFETAMINE DIMESYLATE 50 MG/1
50 CAPSULE ORAL EVERY MORNING
Qty: 30 CAPSULE | Refills: 0 | Status: SHIPPED | OUTPATIENT
Start: 2024-08-16

## 2024-09-16 DIAGNOSIS — F98.8 ATTENTION DEFICIT DISORDER (ADD) IN ADULT: ICD-10-CM

## 2024-09-16 RX ORDER — LISDEXAMFETAMINE DIMESYLATE 50 MG/1
50 CAPSULE ORAL EVERY MORNING
Qty: 30 CAPSULE | Refills: 0 | Status: SHIPPED | OUTPATIENT
Start: 2024-09-16

## 2024-10-15 DIAGNOSIS — F98.8 ATTENTION DEFICIT DISORDER (ADD) IN ADULT: ICD-10-CM

## 2024-10-18 ENCOUNTER — PATIENT MESSAGE (OUTPATIENT)
Dept: FAMILY MEDICINE | Facility: CLINIC | Age: 34
End: 2024-10-18
Payer: COMMERCIAL

## 2024-10-21 RX ORDER — LISDEXAMFETAMINE DIMESYLATE 50 MG/1
50 CAPSULE ORAL EVERY MORNING
Qty: 20 CAPSULE | Refills: 0 | Status: SHIPPED | OUTPATIENT
Start: 2024-10-21

## 2024-11-08 ENCOUNTER — OFFICE VISIT (OUTPATIENT)
Dept: FAMILY MEDICINE | Facility: CLINIC | Age: 34
End: 2024-11-08
Payer: COMMERCIAL

## 2024-11-08 VITALS
TEMPERATURE: 98 F | BODY MASS INDEX: 33.32 KG/M2 | RESPIRATION RATE: 18 BRPM | OXYGEN SATURATION: 98 % | DIASTOLIC BLOOD PRESSURE: 80 MMHG | HEIGHT: 72 IN | HEART RATE: 88 BPM | SYSTOLIC BLOOD PRESSURE: 132 MMHG | WEIGHT: 246 LBS

## 2024-11-08 DIAGNOSIS — F98.8 ATTENTION DEFICIT DISORDER (ADD) IN ADULT: ICD-10-CM

## 2024-11-08 DIAGNOSIS — F41.8 MIXED ANXIETY AND DEPRESSIVE DISORDER: ICD-10-CM

## 2024-11-08 DIAGNOSIS — R74.8 ELEVATED LIVER ENZYMES: ICD-10-CM

## 2024-11-08 DIAGNOSIS — E66.09 CLASS 1 OBESITY DUE TO EXCESS CALORIES WITHOUT SERIOUS COMORBIDITY WITH BODY MASS INDEX (BMI) OF 33.0 TO 33.9 IN ADULT: ICD-10-CM

## 2024-11-08 DIAGNOSIS — J20.9 ACUTE BRONCHITIS, UNSPECIFIED ORGANISM: ICD-10-CM

## 2024-11-08 DIAGNOSIS — E66.811 CLASS 1 OBESITY DUE TO EXCESS CALORIES WITHOUT SERIOUS COMORBIDITY WITH BODY MASS INDEX (BMI) OF 33.0 TO 33.9 IN ADULT: ICD-10-CM

## 2024-11-08 DIAGNOSIS — Z00.00 ENCOUNTER FOR ROUTINE ADULT HEALTH EXAMINATION WITHOUT ABNORMAL FINDINGS: Primary | ICD-10-CM

## 2024-11-08 PROCEDURE — 99999 PR PBB SHADOW E&M-EST. PATIENT-LVL IV: CPT | Mod: PBBFAC,,, | Performed by: NURSE PRACTITIONER

## 2024-11-08 RX ORDER — BUPROPION HYDROCHLORIDE 150 MG/1
150 TABLET ORAL DAILY
Qty: 90 TABLET | Refills: 1 | Status: SHIPPED | OUTPATIENT
Start: 2024-11-08

## 2024-11-08 RX ORDER — LISDEXAMFETAMINE DIMESYLATE 50 MG/1
50 CAPSULE ORAL EVERY MORNING
Qty: 30 CAPSULE | Refills: 0 | Status: CANCELLED | OUTPATIENT
Start: 2024-11-08

## 2024-11-08 RX ORDER — LISDEXAMFETAMINE DIMESYLATE 60 MG/1
60 CAPSULE ORAL EVERY MORNING
Qty: 30 CAPSULE | Refills: 0 | Status: SHIPPED | OUTPATIENT
Start: 2024-11-08

## 2024-11-08 RX ORDER — AZITHROMYCIN 250 MG/1
TABLET, FILM COATED ORAL
Qty: 6 TABLET | Refills: 0 | Status: SHIPPED | OUTPATIENT
Start: 2024-11-08 | End: 2024-11-13

## 2024-11-08 RX ORDER — BUSPIRONE HYDROCHLORIDE 10 MG/1
10 TABLET ORAL 2 TIMES DAILY
Qty: 180 TABLET | Refills: 1 | Status: SHIPPED | OUTPATIENT
Start: 2024-11-08

## 2024-11-08 NOTE — PROGRESS NOTES
SUBJECTIVE:   HPI: Paco Upton  is a 34 y.o. male who presents for annual physical .   Annual Exam    Established patient here for annual exam and physical.  He is taking his medications for anxiety, depression, and ADHD has prescribed without side effects complaints.  Patient does try to take breaks from his Vyvanse when he is able to on the weekends and during holiday breaks.  Feels like since going down to Vyvanse 50 mg due to shortage that the medication is not working as well and some days feels like he has not taken it at all.  He is still taking BuSpar and Wellbutrin for anxiety and depression and is well controlled.  Currently has made some dietary changes and is now taking a daily probiotic with less abdominal bloating and discomfort.  He denies any chest pain, shortness of breath, palpitations, edema, or headaches.  Recent labs from 7/24 reviewed. One LFT elevated- Denies alcohol intake.     Patient also complaining of productive cough with purulent sputum over the past 1-1/2 weeks.  Denies fever, chills, shortness for breath or wheezing.  Denies any sinus congestion or sinus pain.      (Not in a hospital admission)    Review of patient's allergies indicates:   Allergen Reactions    Sulfa (sulfonamide antibiotics) Anaphylaxis     Current Outpatient Medications on File Prior to Visit   Medication Sig Dispense Refill    cetirizine (ZYRTEC) 10 mg Cap       [DISCONTINUED] buPROPion (WELLBUTRIN XL) 150 MG TB24 tablet Take 1 tablet (150 mg total) by mouth once daily. 90 tablet 1    [DISCONTINUED] busPIRone (BUSPAR) 10 MG tablet Take 1 tablet (10 mg total) by mouth 2 (two) times daily. 180 tablet 1    [DISCONTINUED] lisdexamfetamine (VYVANSE) 50 MG capsule Take 1 capsule (50 mg total) by mouth every morning. 20 capsule 0     No current facility-administered medications on file prior to visit.     Past Medical History:   Diagnosis Date    ADD (attention deficit disorder)     Stomach problems     delayed  empting     History reviewed. No pertinent surgical history.  No family history on file.  Social History     Tobacco Use    Smoking status: Never    Smokeless tobacco: Never   Substance Use Topics    Alcohol use: Yes    Drug use: No      Health Maintenance Topics with due status: Not Due       Topic Last Completion Date    TETANUS VACCINE 02/09/2021    RSV Vaccine (Age 60+ and Pregnant patients) Not Due     Immunization History   Administered Date(s) Administered    COVID-19, MRNA, LN-S, PF (Pfizer) (Purple Cap) 01/25/2022, 02/15/2022    DTaP 1990, 03/18/1991, 03/04/1992, 04/08/1994, 11/07/1994    HIB 1990, 03/18/1991, 06/24/1991, 01/01/1992    Hepatitis B, Pediatric/Adolescent 05/04/2004, 05/19/2005, 08/09/2005    IPV 1990, 1990, 03/18/1991, 03/04/1992, 11/07/1994    MMR 11/15/1991, 11/07/1994    Meningococcal Conjugate (MCV4P) 08/14/2009    Td (ADULT) 05/04/2004    Tdap 08/14/2009, 02/09/2021       Review of Systems   Constitutional:  Negative for activity change, appetite change, chills, fatigue, fever and unexpected weight change.   HENT:  Negative for congestion, ear discharge, ear pain, hearing loss, rhinorrhea, sore throat and trouble swallowing.    Eyes:  Negative for pain, discharge and visual disturbance.   Respiratory:  Positive for cough. Negative for chest tightness, shortness of breath and wheezing.    Cardiovascular:  Negative for chest pain, palpitations and leg swelling.   Gastrointestinal:  Negative for abdominal distention, abdominal pain, blood in stool, constipation, diarrhea, nausea and vomiting.   Endocrine: Negative for cold intolerance, heat intolerance, polydipsia and polyuria.   Genitourinary:  Negative for difficulty urinating, dysuria, frequency, hematuria and urgency.   Musculoskeletal:  Negative for arthralgias, back pain, joint swelling and neck pain.   Skin:  Negative for pallor and rash.   Neurological:  Negative for dizziness, weakness, numbness and  headaches.   Hematological:  Negative for adenopathy.   Psychiatric/Behavioral:  Positive for decreased concentration. Negative for confusion, dysphoric mood, sleep disturbance and suicidal ideas. The patient is not nervous/anxious.       OBJECTIVE:      Vitals:    11/08/24 0930   BP: 132/80   BP Location: Left arm   Patient Position: Sitting   Pulse: 88   Resp: 18   Temp: 98.1 °F (36.7 °C)   TempSrc: Oral   SpO2: 98%   Weight: 111.6 kg (246 lb)   Height: 6' (1.829 m)     Physical Exam  Vitals and nursing note reviewed.   Constitutional:       General: He is not in acute distress.     Appearance: Normal appearance. He is well-developed. He is obese. He is not ill-appearing or diaphoretic.   HENT:      Head: Normocephalic and atraumatic.      Right Ear: Tympanic membrane, ear canal and external ear normal.      Left Ear: Tympanic membrane, ear canal and external ear normal.      Nose: Nose normal.      Mouth/Throat:      Mouth: Mucous membranes are moist.      Pharynx: Oropharynx is clear. Uvula midline. No oropharyngeal exudate or posterior oropharyngeal erythema.   Eyes:      General: No scleral icterus.     Extraocular Movements: Extraocular movements intact.      Conjunctiva/sclera: Conjunctivae normal.      Pupils: Pupils are equal, round, and reactive to light.   Neck:      Thyroid: No thyromegaly.   Cardiovascular:      Rate and Rhythm: Normal rate and regular rhythm.      Pulses: Normal pulses.      Heart sounds: Normal heart sounds. No murmur heard.  Pulmonary:      Effort: Pulmonary effort is normal. No respiratory distress.      Breath sounds: Normal breath sounds. No wheezing, rhonchi or rales.   Abdominal:      General: Bowel sounds are normal.      Palpations: Abdomen is soft.      Tenderness: There is no abdominal tenderness.   Musculoskeletal:         General: Normal range of motion.      Cervical back: Normal range of motion and neck supple.      Right lower leg: No edema.      Left lower leg: No  edema.   Lymphadenopathy:      Cervical: No cervical adenopathy.   Skin:     General: Skin is warm and dry.      Coloration: Skin is not jaundiced or pale.   Neurological:      Mental Status: He is alert and oriented to person, place, and time. Mental status is at baseline.   Psychiatric:         Attention and Perception: Attention normal.         Mood and Affect: Mood and affect normal.         Speech: Speech normal.         Behavior: Behavior normal.         Thought Content: Thought content normal.         Judgment: Judgment normal.        Assessment:       1. Encounter for routine adult health examination without abnormal findings    2. Attention deficit disorder (ADD) in adult    3. Mixed anxiety and depressive disorder    4. Acute bronchitis, unspecified organism    5. Class 1 obesity due to excess calories without serious comorbidity with body mass index (BMI) of 33.0 to 33.9 in adult    6. Elevated liver enzymes        Plan:       Encounter for routine adult health examination without abnormal findings   -reviewed labs from July 2024    Attention deficit disorder (ADD) in adult  -     lisdexamfetamine (VYVANSE) 60 MG capsule; Take 1 capsule (60 mg total) by mouth every morning.  Dispense: 30 capsule; Refill: 0  -recommend increase to 60 mg daily, strongly encouraged to take drug holidays/breaks every weekend in order to prevent tolerance and dependence; patient voiced understanding, LA  reviewed; check in with me in 1 month about effectiveness    Mixed anxiety and depressive disorder  -     busPIRone (BUSPAR) 10 MG tablet; Take 1 tablet (10 mg total) by mouth 2 (two) times daily.  Dispense: 180 tablet; Refill: 1  -     buPROPion (WELLBUTRIN XL) 150 MG TB24 tablet; Take 1 tablet (150 mg total) by mouth once daily.  Dispense: 90 tablet; Refill: 1  -stable, continue medications    Acute bronchitis, unspecified organism  -     azithromycin (Z-DELL) 250 MG tablet; Take 2 tablets by mouth on day 1; Take 1 tablet  by mouth on days 2-5  Dispense: 6 tablet; Refill: 0  -exam normal today, recommend Mucinex, Z-Bigg as prescribed until completed, increase clear fluids    Class 1 obesity due to excess calories without serious comorbidity with body mass index (BMI) of 33.0 to 33.9 in adult    Elevated liver enzymes  -     COMPREHENSIVE METABOLIC PANEL; Future; Expected date: 11/08/2024   -continue diet changes, weight loss and exercise efforts, maybe fatty liver buildup but patient had not completed abdominal ultrasound ordered previously; may need to complete if levels are increased next check in 3 months; avoid alcohol and Tylenol products      Counseled on age and gender appropriate medical preventative services, including cancer screenings, immunizations, overall nutritional health, need for a consistent exercise regimen and an overall push towards maintaining a vigorous and active lifestyle.      Follow up in about 3 months (around 2/8/2025) for f/u ADD .        This note was created using [employer] voice recognition software that occasionally misinterprets phrases or words.

## 2024-12-11 DIAGNOSIS — F98.8 ATTENTION DEFICIT DISORDER (ADD) IN ADULT: ICD-10-CM

## 2024-12-11 RX ORDER — DOXYCYCLINE 100 MG/1
100 CAPSULE ORAL 2 TIMES DAILY
Qty: 14 CAPSULE | Refills: 0 | Status: SHIPPED | OUTPATIENT
Start: 2024-12-11

## 2024-12-12 RX ORDER — LISDEXAMFETAMINE DIMESYLATE 60 MG/1
60 CAPSULE ORAL EVERY MORNING
Qty: 30 CAPSULE | Refills: 0 | Status: SHIPPED | OUTPATIENT
Start: 2024-12-12

## 2025-01-10 DIAGNOSIS — F98.8 ATTENTION DEFICIT DISORDER (ADD) IN ADULT: ICD-10-CM

## 2025-01-13 RX ORDER — LISDEXAMFETAMINE DIMESYLATE 60 MG/1
60 CAPSULE ORAL EVERY MORNING
Qty: 30 CAPSULE | Refills: 0 | Status: SHIPPED | OUTPATIENT
Start: 2025-01-13

## 2025-02-12 ENCOUNTER — OFFICE VISIT (OUTPATIENT)
Dept: FAMILY MEDICINE | Facility: CLINIC | Age: 35
End: 2025-02-12
Payer: COMMERCIAL

## 2025-02-12 VITALS
BODY MASS INDEX: 33.09 KG/M2 | HEART RATE: 83 BPM | WEIGHT: 244 LBS | DIASTOLIC BLOOD PRESSURE: 78 MMHG | SYSTOLIC BLOOD PRESSURE: 122 MMHG | TEMPERATURE: 99 F | OXYGEN SATURATION: 99 %

## 2025-02-12 DIAGNOSIS — F41.8 MIXED ANXIETY AND DEPRESSIVE DISORDER: ICD-10-CM

## 2025-02-12 DIAGNOSIS — F98.8 ATTENTION DEFICIT DISORDER (ADD) IN ADULT: Primary | ICD-10-CM

## 2025-02-12 PROCEDURE — 1159F MED LIST DOCD IN RCRD: CPT | Mod: CPTII,S$GLB,, | Performed by: NURSE PRACTITIONER

## 2025-02-12 PROCEDURE — 3008F BODY MASS INDEX DOCD: CPT | Mod: CPTII,S$GLB,, | Performed by: NURSE PRACTITIONER

## 2025-02-12 PROCEDURE — 99214 OFFICE O/P EST MOD 30 MIN: CPT | Mod: S$GLB,,, | Performed by: NURSE PRACTITIONER

## 2025-02-12 PROCEDURE — 3074F SYST BP LT 130 MM HG: CPT | Mod: CPTII,S$GLB,, | Performed by: NURSE PRACTITIONER

## 2025-02-12 PROCEDURE — 99999 PR PBB SHADOW E&M-EST. PATIENT-LVL III: CPT | Mod: PBBFAC,,, | Performed by: NURSE PRACTITIONER

## 2025-02-12 PROCEDURE — 1160F RVW MEDS BY RX/DR IN RCRD: CPT | Mod: CPTII,S$GLB,, | Performed by: NURSE PRACTITIONER

## 2025-02-12 PROCEDURE — 3078F DIAST BP <80 MM HG: CPT | Mod: CPTII,S$GLB,, | Performed by: NURSE PRACTITIONER

## 2025-02-12 PROCEDURE — G2211 COMPLEX E/M VISIT ADD ON: HCPCS | Mod: S$GLB,,, | Performed by: NURSE PRACTITIONER

## 2025-02-12 RX ORDER — BUSPIRONE HYDROCHLORIDE 10 MG/1
10 TABLET ORAL 2 TIMES DAILY
Qty: 180 TABLET | Refills: 1 | Status: SHIPPED | OUTPATIENT
Start: 2025-02-12

## 2025-02-12 RX ORDER — LISDEXAMFETAMINE DIMESYLATE 60 MG/1
60 CAPSULE ORAL EVERY MORNING
Qty: 30 CAPSULE | Refills: 0 | Status: SHIPPED | OUTPATIENT
Start: 2025-02-12

## 2025-02-12 NOTE — PROGRESS NOTES
SUBJECTIVE:      Patient ID: Paco Upton is a 34 y.o. male.    Chief Complaint: Follow-up    Established patient here for follow-up on ADHD and medication refills.  He is taking his medication for ADHD, depression and anxiety as prescribed daily without side effects or complaints.  He is feeling overall good on this medication regimen- Vyvanse 60 mg working better.  He does now take breaks/drug holidays from the Vyvanse on the weekends. LA  reviewed today. Blood pressure is normal on exam today, previous stress test showed no ischemia.  He is trying to working out and taking better care of himself - down 2 lbs.  No new complaints or concerns.        No family history on file.   Social History     Socioeconomic History    Marital status:    Tobacco Use    Smoking status: Never    Smokeless tobacco: Never   Substance and Sexual Activity    Alcohol use: Yes    Drug use: No    Sexual activity: Yes     Partners: Female     Birth control/protection: OCP     Social Drivers of Health     Financial Resource Strain: Low Risk  (3/11/2024)    Overall Financial Resource Strain (CARDIA)     Difficulty of Paying Living Expenses: Not very hard   Food Insecurity: No Food Insecurity (3/11/2024)    Hunger Vital Sign     Worried About Running Out of Food in the Last Year: Never true     Ran Out of Food in the Last Year: Never true   Transportation Needs: No Transportation Needs (3/11/2024)    PRAPARE - Transportation     Lack of Transportation (Medical): No     Lack of Transportation (Non-Medical): No   Physical Activity: Sufficiently Active (3/11/2024)    Exercise Vital Sign     Days of Exercise per Week: 5 days     Minutes of Exercise per Session: 120 min   Stress: Stress Concern Present (3/11/2024)    [employer] - Occupational Stress Questionnaire     Feeling of Stress : To some extent   Housing Stability: Low Risk  (3/11/2024)    Housing Stability Vital Sign     Unable to Pay for  Housing in the Last Year: No     Number of Places Lived in the Last Year: 1     Unstable Housing in the Last Year: No     Current Outpatient Medications   Medication Sig Dispense Refill    buPROPion (WELLBUTRIN XL) 150 MG TB24 tablet Take 1 tablet (150 mg total) by mouth once daily. 90 tablet 1    cetirizine (ZYRTEC) 10 mg Cap       busPIRone (BUSPAR) 10 MG tablet Take 1 tablet (10 mg total) by mouth 2 (two) times daily. 180 tablet 1    lisdexamfetamine (VYVANSE) 60 MG capsule Take 1 capsule (60 mg total) by mouth every morning. 30 capsule 0     No current facility-administered medications for this visit.     Review of patient's allergies indicates:   Allergen Reactions    Sulfa (sulfonamide antibiotics) Anaphylaxis      Past Medical History:   Diagnosis Date    ADD (attention deficit disorder)     Stomach problems     delayed empting     No past surgical history on file.    Review of Systems   Constitutional:  Negative for fatigue, fever and unexpected weight change.   Respiratory:  Negative for shortness of breath.    Cardiovascular:  Negative for chest pain and palpitations.   Gastrointestinal:  Negative for abdominal pain, diarrhea, nausea and vomiting.   Neurological:  Negative for dizziness and headaches.   Psychiatric/Behavioral:  Positive for decreased concentration. Negative for confusion, dysphoric mood, sleep disturbance and suicidal ideas. The patient is nervous/anxious.       OBJECTIVE:      Vitals:    02/12/25 1423   BP: 122/78   BP Location: Left arm   Patient Position: Sitting   Pulse: 83   Temp: 98.5 °F (36.9 °C)   TempSrc: Oral   SpO2: 99%   Weight: 110.7 kg (244 lb)     Physical Exam  Vitals and nursing note reviewed.   Constitutional:       General: He is not in acute distress.     Appearance: Normal appearance. He is well-developed. He is obese. He is not ill-appearing or diaphoretic.   HENT:      Head: Normocephalic.      Mouth/Throat:      Mouth: Mucous membranes are moist.      Pharynx: Uvula  midline.   Eyes:      General: No scleral icterus.     Pupils: Pupils are equal, round, and reactive to light.   Cardiovascular:      Rate and Rhythm: Normal rate and regular rhythm.      Heart sounds: Normal heart sounds. No murmur heard.  Pulmonary:      Effort: Pulmonary effort is normal. No respiratory distress.      Breath sounds: Normal breath sounds.   Musculoskeletal:         General: Normal range of motion.      Cervical back: Normal range of motion and neck supple.      Right lower leg: No edema.      Left lower leg: No edema.   Lymphadenopathy:      Cervical: No cervical adenopathy.   Skin:     General: Skin is warm and dry.      Coloration: Skin is not jaundiced or pale.   Neurological:      Mental Status: He is alert and oriented to person, place, and time.   Psychiatric:         Attention and Perception: Attention normal.         Mood and Affect: Mood and affect normal.         Speech: Speech normal.         Behavior: Behavior normal.         Thought Content: Thought content normal.         Judgment: Judgment normal.        Assessment:       1. Attention deficit disorder (ADD) in adult    2. Mixed anxiety and depressive disorder        Plan:       Attention deficit disorder (ADD) in adult  -     lisdexamfetamine (VYVANSE) 60 MG capsule; Take 1 capsule (60 mg total) by mouth every morning.  Dispense: 30 capsule; Refill: 0   -cont  vyvanse 60 mg daily, strongly encouraged to take drug holidays/breaks every weekend in order to prevent tolerance and dependence; patient voiced understanding, LA  reviewed    Mixed anxiety and depressive disorder  -     busPIRone (BUSPAR) 10 MG tablet; Take 1 tablet (10 mg total) by mouth 2 (two) times daily.  Dispense: 180 tablet; Refill: 1   -stable on Wellbutrin and Buspar; cont daily       Follow up in about 3 months (around 5/12/2025), or if symptoms worsen or fail to improve.      2/12/2025 JESSICA Parr, FNP    This note was created using [employer] voice  recognition software that occasionally misinterprets phrases or words.

## 2025-03-18 DIAGNOSIS — F98.8 ATTENTION DEFICIT DISORDER (ADD) IN ADULT: ICD-10-CM

## 2025-03-18 RX ORDER — LISDEXAMFETAMINE DIMESYLATE 60 MG/1
60 CAPSULE ORAL EVERY MORNING
Qty: 30 CAPSULE | Refills: 0 | Status: SHIPPED | OUTPATIENT
Start: 2025-03-18

## 2025-04-21 DIAGNOSIS — F98.8 ATTENTION DEFICIT DISORDER (ADD) IN ADULT: ICD-10-CM

## 2025-04-21 RX ORDER — LISDEXAMFETAMINE DIMESYLATE 60 MG/1
60 CAPSULE ORAL EVERY MORNING
Qty: 30 CAPSULE | Refills: 0 | Status: SHIPPED | OUTPATIENT
Start: 2025-04-21

## 2025-05-12 ENCOUNTER — PATIENT MESSAGE (OUTPATIENT)
Dept: FAMILY MEDICINE | Facility: CLINIC | Age: 35
End: 2025-05-12
Payer: COMMERCIAL

## 2025-05-22 ENCOUNTER — TELEPHONE (OUTPATIENT)
Dept: FAMILY MEDICINE | Facility: CLINIC | Age: 35
End: 2025-05-22
Payer: COMMERCIAL

## 2025-05-26 DIAGNOSIS — F98.8 ATTENTION DEFICIT DISORDER (ADD) IN ADULT: ICD-10-CM

## 2025-05-26 RX ORDER — LISDEXAMFETAMINE DIMESYLATE 60 MG/1
60 CAPSULE ORAL EVERY MORNING
Qty: 30 CAPSULE | Refills: 0 | OUTPATIENT
Start: 2025-05-26

## 2025-05-27 RX ORDER — LISDEXAMFETAMINE DIMESYLATE 60 MG/1
60 CAPSULE ORAL EVERY MORNING
Qty: 30 CAPSULE | Refills: 0 | Status: SHIPPED | OUTPATIENT
Start: 2025-05-27

## 2025-06-18 ENCOUNTER — OFFICE VISIT (OUTPATIENT)
Dept: FAMILY MEDICINE | Facility: CLINIC | Age: 35
End: 2025-06-18
Payer: COMMERCIAL

## 2025-06-18 VITALS
OXYGEN SATURATION: 98 % | DIASTOLIC BLOOD PRESSURE: 76 MMHG | RESPIRATION RATE: 18 BRPM | BODY MASS INDEX: 32.22 KG/M2 | SYSTOLIC BLOOD PRESSURE: 130 MMHG | TEMPERATURE: 98 F | HEIGHT: 72 IN | HEART RATE: 111 BPM | WEIGHT: 237.88 LBS

## 2025-06-18 DIAGNOSIS — Z00.00 PREVENTATIVE HEALTH CARE: ICD-10-CM

## 2025-06-18 DIAGNOSIS — F41.8 MIXED ANXIETY AND DEPRESSIVE DISORDER: Primary | ICD-10-CM

## 2025-06-18 DIAGNOSIS — R74.8 ELEVATED LIVER ENZYMES: ICD-10-CM

## 2025-06-18 DIAGNOSIS — F98.8 ATTENTION DEFICIT DISORDER (ADD) IN ADULT: ICD-10-CM

## 2025-06-18 PROCEDURE — 99999 PR PBB SHADOW E&M-EST. PATIENT-LVL IV: CPT | Mod: PBBFAC,,, | Performed by: NURSE PRACTITIONER

## 2025-06-18 RX ORDER — BUPROPION HYDROCHLORIDE 150 MG/1
150 TABLET ORAL DAILY
Qty: 90 TABLET | Refills: 1 | Status: SHIPPED | OUTPATIENT
Start: 2025-06-18

## 2025-06-18 RX ORDER — LISDEXAMFETAMINE DIMESYLATE 60 MG/1
60 CAPSULE ORAL EVERY MORNING
Qty: 30 CAPSULE | Refills: 0 | Status: SHIPPED | OUTPATIENT
Start: 2025-06-18

## 2025-06-18 NOTE — PROGRESS NOTES
SUBJECTIVE:      Patient ID: Paco Upton is a 34 y.o. male.    Chief Complaint: ADHD    History of Present Illness    CHIEF COMPLAINT:  Paco presents today for follow up and med refills    VITAL SIGNS:  Has elevated HR today which he attributes to rushing to the appointment and running errands beforehand.  Blood pressure is normal on exam today.    MEDICATIONS:  He reports Buspar is working well and requests a Wellbutrin refill.  Depression and anxiety are controlled, patient denies any side effects from medications.  Taking Vyvanse daily except on weekends for ADD, medication working well and helping him stay focused and productive at work.  Denies chest pain, palpitations, rapid or irregular heartbeats, dizziness or headaches.    DIET AND HYDRATION:  He typically eats daily at night around 8 PM. He reports significant fluid losses through sweating, losing approximately 6-8 lbs of fluid daily and changing shirts approximately 4 times per day due to sweating.     Annual labs due in 7/25         No family history on file.   Social History     Socioeconomic History    Marital status:    Tobacco Use    Smoking status: Never    Smokeless tobacco: Never   Substance and Sexual Activity    Alcohol use: Yes    Drug use: No    Sexual activity: Yes     Partners: Female     Birth control/protection: OCP     Social Drivers of Health     Financial Resource Strain: Medium Risk (6/17/2025)    Overall Financial Resource Strain (CARDIA)     Difficulty of Paying Living Expenses: Somewhat hard   Food Insecurity: No Food Insecurity (6/17/2025)    Hunger Vital Sign     Worried About Running Out of Food in the Last Year: Never true     Ran Out of Food in the Last Year: Never true   Transportation Needs: No Transportation Needs (6/17/2025)    PRAPARE - Transportation     Lack of Transportation (Medical): No     Lack of Transportation (Non-Medical): No   Physical Activity: Sufficiently Active (6/17/2025)    Exercise Vital  Sign     Days of Exercise per Week: 7 days     Minutes of Exercise per Session: 80 min   Stress: Stress Concern Present (6/17/2025)    [employer] - Occupational Stress Questionnaire     Feeling of Stress : Rather much   Housing Stability: Low Risk  (6/17/2025)    Housing Stability Vital Sign     Unable to Pay for Housing in the Last Year: No     Number of Times Moved in the Last Year: 0     Homeless in the Last Year: No     Current Medications[1]  Review of patient's allergies indicates:   Allergen Reactions    Sulfa (sulfonamide antibiotics) Anaphylaxis      Past Medical History:   Diagnosis Date    ADD (attention deficit disorder)     Stomach problems     delayed empting     History reviewed. No pertinent surgical history.    Review of Systems   Constitutional:  Positive for appetite change (decrease due to heat). Negative for fatigue, fever and unexpected weight change.   Respiratory:  Negative for shortness of breath.    Cardiovascular:  Negative for chest pain and palpitations.   Gastrointestinal:  Negative for abdominal pain, diarrhea, nausea and vomiting.   Neurological:  Negative for dizziness and headaches.   Psychiatric/Behavioral:  Positive for decreased concentration. Negative for confusion, dysphoric mood, sleep disturbance and suicidal ideas. The patient is nervous/anxious.       OBJECTIVE:      Vitals:    06/18/25 1426   BP: 130/76   BP Location: Left arm   Patient Position: Sitting   Pulse: (!) 111   Resp: 18   Temp: 98.1 °F (36.7 °C)   TempSrc: Oral   SpO2: 98%   Weight: 107.9 kg (237 lb 14 oz)   Height: 6' (1.829 m)     Physical Exam  Vitals and nursing note reviewed.   Constitutional:       General: He is not in acute distress.     Appearance: Normal appearance. He is well-developed. He is obese. He is not ill-appearing or diaphoretic.   HENT:      Head: Normocephalic.      Mouth/Throat:      Mouth: Mucous membranes are moist.      Pharynx: Uvula midline.   Eyes:       General: No scleral icterus.     Pupils: Pupils are equal, round, and reactive to light.   Cardiovascular:      Rate and Rhythm: Regular rhythm. Tachycardia present.      Heart sounds: Normal heart sounds. No murmur heard.  Pulmonary:      Effort: Pulmonary effort is normal. No respiratory distress.      Breath sounds: Normal breath sounds.   Musculoskeletal:         General: Normal range of motion.      Cervical back: Normal range of motion and neck supple.      Right lower leg: No edema.      Left lower leg: No edema.   Lymphadenopathy:      Cervical: No cervical adenopathy.   Skin:     General: Skin is warm and dry.      Coloration: Skin is not jaundiced or pale.   Neurological:      Mental Status: He is alert and oriented to person, place, and time.   Psychiatric:         Attention and Perception: Attention normal.         Mood and Affect: Mood and affect normal.         Speech: Speech normal.         Behavior: Behavior normal.         Thought Content: Thought content normal.         Judgment: Judgment normal.        Assessment:       1. Mixed anxiety and depressive disorder    2. Attention deficit disorder (ADD) in adult    3. Elevated liver enzymes    4. Preventative health care        Plan:      Mixed anxiety and depressive disorder  -     buPROPion (WELLBUTRIN XL) 150 MG TB24 tablet; Take 1 tablet (150 mg total) by mouth once daily.  Dispense: 90 tablet; Refill: 1  -stable, continue Wellbutrin and BuSpar as prescribed    Attention deficit disorder (ADD) in adult  -     lisdexamfetamine (VYVANSE) 60 MG capsule; Take 1 capsule (60 mg total) by mouth every morning.  Dispense: 30 capsule; Refill: 0  -continue Vyvanse, monitor blood pressure and heart rate at home; advised hold medication if heart rate greater than 100 or blood pressure over 140/90; patient has pulse ox at home and will monitor, avoid excessive caffeine, stay hydrated with plenty of water; take breaks on weekends, patient voiced understanding and  will contact me for any persistent issues or new problems; LA  reviewed and no concerns    Elevated liver enzymes  -     Comprehensive Metabolic Panel; Future; Expected date: 06/18/2025  -recheck labs    Preventative health care  -     CBC Auto Differential; Future; Expected date: 06/18/2025  -     Comprehensive Metabolic Panel; Future; Expected date: 06/18/2025  -     Lipid Panel; Future; Expected date: 06/18/2025  -     TSH; Future; Expected date: 06/18/2025  -annual labs due in July        Follow up in about 3 months (around 9/18/2025) for f/u ADD.      6/18/2025 Rubi Degroot, APRN, FNP  This note was generated with the assistance of ambient listening technology. Verbal consent was obtained by the patient and accompanying visitor(s) for the recording of patient appointment to facilitate this note. I attest to having reviewed and edited the generated note for accuracy, though some syntax or spelling errors may persist. Please contact the author of this note for any clarification.     This note was created using [employer] voice recognition software that occasionally misinterprets phrases or words.            [1]   Current Outpatient Medications   Medication Sig Dispense Refill    busPIRone (BUSPAR) 10 MG tablet Take 1 tablet (10 mg total) by mouth 2 (two) times daily. 180 tablet 1    cetirizine (ZYRTEC) 10 mg Cap       buPROPion (WELLBUTRIN XL) 150 MG TB24 tablet Take 1 tablet (150 mg total) by mouth once daily. 90 tablet 1    lisdexamfetamine (VYVANSE) 60 MG capsule Take 1 capsule (60 mg total) by mouth every morning. 30 capsule 0     No current facility-administered medications for this visit.

## 2025-07-15 ENCOUNTER — PATIENT MESSAGE (OUTPATIENT)
Dept: FAMILY MEDICINE | Facility: CLINIC | Age: 35
End: 2025-07-15
Payer: COMMERCIAL

## 2025-07-28 DIAGNOSIS — F98.8 ATTENTION DEFICIT DISORDER (ADD) IN ADULT: ICD-10-CM

## 2025-07-29 RX ORDER — LISDEXAMFETAMINE DIMESYLATE 60 MG/1
60 CAPSULE ORAL EVERY MORNING
Qty: 30 CAPSULE | Refills: 0 | Status: SHIPPED | OUTPATIENT
Start: 2025-07-29

## 2025-08-18 DIAGNOSIS — F41.8 MIXED ANXIETY AND DEPRESSIVE DISORDER: ICD-10-CM

## 2025-08-19 DIAGNOSIS — F41.8 MIXED ANXIETY AND DEPRESSIVE DISORDER: ICD-10-CM

## 2025-08-19 RX ORDER — BUSPIRONE HYDROCHLORIDE 10 MG/1
10 TABLET ORAL 2 TIMES DAILY
Qty: 180 TABLET | Refills: 1 | OUTPATIENT
Start: 2025-08-19

## 2025-08-20 RX ORDER — BUSPIRONE HYDROCHLORIDE 10 MG/1
10 TABLET ORAL 2 TIMES DAILY
Qty: 180 TABLET | Refills: 1 | Status: SHIPPED | OUTPATIENT
Start: 2025-08-20

## 2025-08-27 DIAGNOSIS — F98.8 ATTENTION DEFICIT DISORDER (ADD) IN ADULT: ICD-10-CM

## 2025-08-27 RX ORDER — LISDEXAMFETAMINE DIMESYLATE 60 MG/1
60 CAPSULE ORAL EVERY MORNING
Qty: 30 CAPSULE | Refills: 0 | Status: SHIPPED | OUTPATIENT
Start: 2025-08-27